# Patient Record
Sex: MALE | Race: BLACK OR AFRICAN AMERICAN | NOT HISPANIC OR LATINO
[De-identification: names, ages, dates, MRNs, and addresses within clinical notes are randomized per-mention and may not be internally consistent; named-entity substitution may affect disease eponyms.]

---

## 2017-04-19 ENCOUNTER — RECORD ABSTRACTING (OUTPATIENT)
Age: 32
End: 2017-04-19

## 2017-04-19 DIAGNOSIS — F25.9 SCHIZOAFFECTIVE DISORDER, UNSPECIFIED: ICD-10-CM

## 2017-04-19 DIAGNOSIS — Z78.9 OTHER SPECIFIED HEALTH STATUS: ICD-10-CM

## 2017-04-19 DIAGNOSIS — Z87.2 PERSONAL HISTORY OF DISEASES OF THE SKIN AND SUBCUTANEOUS TISSUE: ICD-10-CM

## 2017-04-19 DIAGNOSIS — Z86.19 PERSONAL HISTORY OF OTHER INFECTIOUS AND PARASITIC DISEASES: ICD-10-CM

## 2017-04-19 DIAGNOSIS — M25.512 PAIN IN LEFT SHOULDER: ICD-10-CM

## 2017-04-19 PROBLEM — Z00.00 ENCOUNTER FOR PREVENTIVE HEALTH EXAMINATION: Status: ACTIVE | Noted: 2017-04-19

## 2017-04-19 RX ORDER — FENOFIBRATE 145 MG/1
TABLET ORAL
Refills: 0 | Status: ACTIVE | COMMUNITY

## 2017-04-19 RX ORDER — FLUPHENAZINE HCL 5 MG
TABLET ORAL
Refills: 0 | Status: ACTIVE | COMMUNITY

## 2017-04-19 RX ORDER — LORATADINE 10 MG/1
TABLET ORAL
Refills: 0 | Status: ACTIVE | COMMUNITY

## 2017-04-19 RX ORDER — MULTIVITAMIN
TABLET ORAL
Refills: 0 | Status: ACTIVE | COMMUNITY

## 2017-09-26 ENCOUNTER — EMERGENCY (EMERGENCY)
Facility: HOSPITAL | Age: 32
LOS: 0 days | Discharge: HOME | End: 2017-09-26
Admitting: INTERNAL MEDICINE

## 2017-09-26 DIAGNOSIS — F20.0 PARANOID SCHIZOPHRENIA: ICD-10-CM

## 2017-09-26 DIAGNOSIS — M54.5 LOW BACK PAIN: ICD-10-CM

## 2017-09-26 DIAGNOSIS — Z79.899 OTHER LONG TERM (CURRENT) DRUG THERAPY: ICD-10-CM

## 2017-09-26 DIAGNOSIS — F32.9 MAJOR DEPRESSIVE DISORDER, SINGLE EPISODE, UNSPECIFIED: ICD-10-CM

## 2017-10-16 ENCOUNTER — OUTPATIENT (OUTPATIENT)
Dept: OUTPATIENT SERVICES | Facility: HOSPITAL | Age: 32
LOS: 1 days | Discharge: HOME | End: 2017-10-16

## 2017-10-16 ENCOUNTER — APPOINTMENT (OUTPATIENT)
Dept: PODIATRY | Facility: CLINIC | Age: 32
End: 2017-10-16

## 2017-10-16 VITALS — SYSTOLIC BLOOD PRESSURE: 106 MMHG | DIASTOLIC BLOOD PRESSURE: 77 MMHG | HEART RATE: 98 BPM | WEIGHT: 174 LBS

## 2017-10-16 DIAGNOSIS — B35.1 TINEA UNGUIUM: ICD-10-CM

## 2017-10-16 DIAGNOSIS — M79.671 PAIN IN RIGHT FOOT: ICD-10-CM

## 2017-10-16 DIAGNOSIS — L85.3 XEROSIS CUTIS: ICD-10-CM

## 2017-10-16 DIAGNOSIS — M79.672 PAIN IN LEFT FOOT: ICD-10-CM

## 2017-10-18 DIAGNOSIS — M79.671 PAIN IN RIGHT FOOT: ICD-10-CM

## 2017-10-18 DIAGNOSIS — M79.672 PAIN IN LEFT FOOT: ICD-10-CM

## 2017-10-18 DIAGNOSIS — B35.1 TINEA UNGUIUM: ICD-10-CM

## 2017-10-18 DIAGNOSIS — L85.3 XEROSIS CUTIS: ICD-10-CM

## 2018-04-16 ENCOUNTER — APPOINTMENT (OUTPATIENT)
Dept: PODIATRY | Facility: CLINIC | Age: 33
End: 2018-04-16

## 2018-06-21 ENCOUNTER — EMERGENCY (EMERGENCY)
Facility: HOSPITAL | Age: 33
LOS: 0 days | Discharge: HOME | End: 2018-06-21
Admitting: PHYSICIAN ASSISTANT

## 2018-06-21 VITALS
TEMPERATURE: 97 F | SYSTOLIC BLOOD PRESSURE: 112 MMHG | DIASTOLIC BLOOD PRESSURE: 60 MMHG | HEART RATE: 74 BPM | RESPIRATION RATE: 18 BRPM | OXYGEN SATURATION: 99 %

## 2018-06-21 DIAGNOSIS — Z79.899 OTHER LONG TERM (CURRENT) DRUG THERAPY: ICD-10-CM

## 2018-06-21 DIAGNOSIS — B86 SCABIES: ICD-10-CM

## 2018-06-21 DIAGNOSIS — L29.9 PRURITUS, UNSPECIFIED: ICD-10-CM

## 2018-06-21 RX ORDER — PERMETHRIN CREAM 5% W/W 50 MG/G
1 CREAM TOPICAL
Qty: 1 | Refills: 1 | OUTPATIENT
Start: 2018-06-21 | End: 2018-06-22

## 2018-06-21 NOTE — ED PROVIDER NOTE - OBJECTIVE STATEMENT
32 y.o. male with a PMHx of Schizoaffective disorder presented to the ER c/o diffuse pruritis worse at night.  Pt states that there are other patients at [facility] with similar.  Pruritis worse at night.  Pt denies fever, chills, rash.  No other complaints.

## 2018-06-21 NOTE — ED PROVIDER NOTE - MEDICAL DECISION MAKING DETAILS
topical permethrin; counseled; any new or worsening symptoms, pt will return to ER topical permethrin; counseled; any new or worsening symptoms, pt will return to ER  Note complete

## 2019-02-08 PROBLEM — F25.9 SCHIZOAFFECTIVE DISORDER, UNSPECIFIED: Chronic | Status: ACTIVE | Noted: 2018-06-21

## 2019-02-28 ENCOUNTER — APPOINTMENT (OUTPATIENT)
Dept: PODIATRY | Facility: CLINIC | Age: 34
End: 2019-02-28

## 2019-11-02 ENCOUNTER — EMERGENCY (EMERGENCY)
Facility: HOSPITAL | Age: 34
LOS: 0 days | Discharge: HOME | End: 2019-11-02
Admitting: EMERGENCY MEDICINE
Payer: MEDICARE

## 2019-11-02 VITALS
HEART RATE: 90 BPM | OXYGEN SATURATION: 99 % | RESPIRATION RATE: 17 BRPM | SYSTOLIC BLOOD PRESSURE: 100 MMHG | DIASTOLIC BLOOD PRESSURE: 68 MMHG | TEMPERATURE: 99 F

## 2019-11-02 DIAGNOSIS — R68.83 CHILLS (WITHOUT FEVER): ICD-10-CM

## 2019-11-02 DIAGNOSIS — J06.9 ACUTE UPPER RESPIRATORY INFECTION, UNSPECIFIED: ICD-10-CM

## 2019-11-02 PROCEDURE — 99283 EMERGENCY DEPT VISIT LOW MDM: CPT

## 2019-11-02 NOTE — ED PROVIDER NOTE - NS ED ROS FT
Review of Systems    Constitutional: (+) felt warm, (+) chills  Eyes/ENT: (-) blurry vision, (-) epistaxis, (+) sore throat, (+) congestion  Cardiovascular: (-) chest pain, (-) syncope  Respiratory: (+) cough, (-) shortness of breath  Gastrointestinal: (-) pain, (-) nausea, (-) vomiting, (-) diarrhea  Musculoskeletal: (-) neck pain, (-) back pain, (-) joint pain  Integumentary: (-) rash, (-) edema  Neurological: (-) headache, (-) altered mental status

## 2019-11-02 NOTE — ED PROVIDER NOTE - PATIENT PORTAL LINK FT
You can access the FollowMyHealth Patient Portal offered by [facility] by registering at the following website: [email or web address]. By joining [employer]’s FollowMyHealth portal, you will also be able to view your health information using other applications (apps) compatible with our system.

## 2019-11-02 NOTE — ED PROVIDER NOTE - OBJECTIVE STATEMENT
35 yo M c/o flu like symptoms. Patient states since last night he felt warm, had chills, stuffy nose, congestion, sore throat and cough. No CP, SOB, n/v, or abdominal pains.

## 2019-11-02 NOTE — ED ADULT NURSE NOTE - OBJECTIVE STATEMENT
Patient is a 34 year old male presents to ED from [facility] with complaints of flu like symptoms. Patient states since last night he felt warm, had chills, stuffy nose, congestion, sore throat and cough. Denies any chest pain, shortness of breath, nausea, vomiting or abdominal pain

## 2019-11-02 NOTE — ED PROVIDER NOTE - PHYSICAL EXAMINATION
Gen: Alert, NAD, well appearing  Head: NC, AT, PERRL, EOMI, normal lids/conjunctiva  ENT: normal hearing, + nasal congestion. +patent oropharynx with erythema. No exudate. TM normal b/l  Neck: +supple, no tenderness/meningismus,  Pulm: Bilateral BS, normal resp effort, no wheeze/stridor/retractions  CV: RRR, no murmer  Abd: soft, NT/ND  Mskel: no edema/erythema/cyanosis  Skin: no rash, warm/dry  Neuro: AAOx3, no sensory/motor deficits

## 2020-02-14 ENCOUNTER — OUTPATIENT (OUTPATIENT)
Dept: OUTPATIENT SERVICES | Facility: HOSPITAL | Age: 35
LOS: 1 days | Discharge: HOME | End: 2020-02-14
Payer: MEDICARE

## 2020-02-14 PROCEDURE — 92002 INTRM OPH EXAM NEW PATIENT: CPT

## 2020-02-26 ENCOUNTER — OUTPATIENT (OUTPATIENT)
Dept: OUTPATIENT SERVICES | Facility: HOSPITAL | Age: 35
LOS: 1 days | Discharge: HOME | End: 2020-02-26
Payer: MEDICARE

## 2020-02-26 DIAGNOSIS — Z86.69 PERSONAL HISTORY OF OTHER DISEASES OF THE NERVOUS SYSTEM AND SENSE ORGANS: ICD-10-CM

## 2020-02-26 DIAGNOSIS — H02.409 UNSPECIFIED PTOSIS OF UNSPECIFIED EYELID: ICD-10-CM

## 2020-02-26 DIAGNOSIS — H52.7 UNSPECIFIED DISORDER OF REFRACTION: ICD-10-CM

## 2020-02-26 PROCEDURE — 76512 OPH US DX B-SCAN: CPT | Mod: 26

## 2020-02-26 PROCEDURE — 92202 OPSCPY EXTND ON/MAC DRAW: CPT

## 2020-02-26 PROCEDURE — 92012 INTRM OPH EXAM EST PATIENT: CPT

## 2020-06-23 ENCOUNTER — EMERGENCY (EMERGENCY)
Facility: HOSPITAL | Age: 35
LOS: 0 days | Discharge: HOME | End: 2020-06-23
Attending: EMERGENCY MEDICINE | Admitting: EMERGENCY MEDICINE
Payer: MEDICARE

## 2020-06-23 VITALS
RESPIRATION RATE: 18 BRPM | OXYGEN SATURATION: 96 % | SYSTOLIC BLOOD PRESSURE: 119 MMHG | HEART RATE: 85 BPM | DIASTOLIC BLOOD PRESSURE: 79 MMHG | TEMPERATURE: 98 F
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
## 2020-06-23 DIAGNOSIS — S40.811A ABRASION OF RIGHT UPPER ARM, INITIAL ENCOUNTER: ICD-10-CM
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
## 2020-06-23 DIAGNOSIS — S40.812A ABRASION OF LEFT UPPER ARM, INITIAL ENCOUNTER: ICD-10-CM

## 2020-06-23 DIAGNOSIS — M79.644 PAIN IN RIGHT FINGER(S): ICD-10-CM

## 2020-06-23 PROCEDURE — 99283 EMERGENCY DEPT VISIT LOW MDM: CPT | Mod: 25

## 2020-06-23 PROCEDURE — 73130 X-RAY EXAM OF HAND: CPT | Mod: 26,RT

## 2020-06-23 PROCEDURE — 29130 APPL FINGER SPLINT STATIC: CPT

## 2020-06-23 RX ORDER — ACETAMINOPHEN 500 MG
650 TABLET ORAL ONCE
Refills: 0 | Status: COMPLETED | OUTPATIENT
Start: 2020-06-23 | End: 2020-06-23

## 2020-06-23 RX ADMIN — Medication 650 MILLIGRAM(S): at 14:58

## 2020-06-23 NOTE — ED PROVIDER NOTE - CARE PROVIDER_API CALL
Satisfactory
Samm Silva  Orthopaedic Surgery  [address] [place] [address]  Phone: [phone]  Fax: [phone]  Follow Up Time:

## 2020-06-23 NOTE — ED PROVIDER NOTE - PATIENT PORTAL LINK FT
You can access the FollowMyHealth Patient Portal offered by [facility] by registering at the following website: [email or web address]. By joining [employer]’s FollowMyHealth portal, you will also be able to view your health information using other applications (apps) compatible with our system.

## 2020-06-23 NOTE — ED PROVIDER NOTE - PHYSICAL EXAMINATION
CONSTITUTIONAL: Well-appearing; well-nourished; in no apparent distress.   EYES: PERRL; EOM intact.   ENT: normal nose; no rhinorrhea; normal pharynx with no tonsillar hypertrophy.   NECK: Supple; non-tender; no cervical lymphadenopathy. No JVD.   CARDIOVASCULAR: Normal S1, S2; no murmurs, rubs, or gallops.   RESPIRATORY: Normal chest excursion with respiration; breath sounds clear and equal bilaterally; no wheezes, rhonchi, or rales.  GI/: Normal bowel sounds; non-distended; non-tender; no palpable organomegaly.   MS: No evidence of trauma or deformity. + mild ttp to rt 1 MCP. Abduction/adduction thumb intact. No snuffbox ttp. Radial pulses intact. b/l hand  intact. Normal ROM in all four extremities;   SKIN: + mult small abrasions to b/l upper extrem. No lacerations, ecchymosis  NEURO/PSYCH: A & O x 4; grossly unremarkable. mood and manner are appropriate. No focal deficits

## 2020-06-23 NOTE — ED PROVIDER NOTE - NS ED ROS FT
Constitutional: no fever, chills, no recent weight loss, change in appetite or malaise  Eyes: no redness/discharge/pain/vision changes  ENT: no rhinorrhea/ear pain/sore throat  Cardiac: No chest pain, SOB or edema.  Respiratory: No cough or respiratory distress  GI: No nausea, vomiting, diarrhea or abdominal pain.  MS: + rt thumb pain. no loss of ROM, no weakness  Neuro: No headache or weakness. No LOC.  Skin: + mult abrasions to b/l upper extrem  Endocrine: No history of thyroid disease or diabetes.  Except as documented in the HPI, all other systems are negative.

## 2021-05-24 NOTE — ED PROVIDER NOTE - CARE PROVIDER_API CALL
Primary Defect Length In Cm (Final Defect Size - Required For Flaps/Grafts): 1.5 González Gonzalez)  [place] [address]  Phone: [phone]  Fax: [phone]  Follow Up Time: 1-3 Days

## 2021-06-22 ENCOUNTER — APPOINTMENT (OUTPATIENT)
Dept: NUTRITION | Facility: CLINIC | Age: 36
End: 2021-06-22

## 2021-08-04 ENCOUNTER — NON-APPOINTMENT (OUTPATIENT)
Age: 36
End: 2021-08-04

## 2021-08-05 ENCOUNTER — OUTPATIENT (OUTPATIENT)
Dept: OUTPATIENT SERVICES | Facility: HOSPITAL | Age: 36
LOS: 1 days | Discharge: HOME | End: 2021-08-05

## 2021-08-05 ENCOUNTER — APPOINTMENT (OUTPATIENT)
Dept: NUTRITION | Facility: CLINIC | Age: 36
End: 2021-08-05

## 2021-08-24 NOTE — ED PROVIDER NOTE - GASTROINTESTINAL, MLM
08/23/21 1432   Right Leg Edema Point of Measurement   Leg circumference 31 cm   Ankle circumference 24 cm   Left Leg Edema Point of Measurement   Leg circumference 30 cm   Ankle circumference 24 cm   LLE Peripheral Vascular    Capillary Refill Less than/equal to 3 seconds   Color Red   Temperature Warm   Pedal Pulse Palpable   RLE Peripheral Vascular    Capillary Refill Less than/equal to 3 seconds   Color Red   Temperature Warm   Pedal Pulse Palpable   Ankle-Brachial Index   Right Arm Systolic Pressure 156 mmHg   Left Arm Systolic Pressure 0   Left Ankle Systolic Pressure: Posterior Tibial (PT) 0 mmHg   Left Ankle Systolic Pressure: Dorsalis Pedis (DP) 172 mmHg   Left JOSHUA 1.38 mmHg   Wound Heel Lateral;Right #1 08/23/21   Date First Assessed/Time First Assessed: 08/23/21 1431   Present on Hospital Admission: Yes  Wound Approximate Age at First Assessment (Weeks): (c)   Primary Wound Type: Pressure Injury  Location: Heel  Wound Location Orientation: Lateral;Right  Wound. ..    Wound Image    Wound Etiology Pressure Unstageable   Cleansed Cleansed with saline   Wound Length (cm) 2.5 cm   Wound Width (cm) 3 cm   Wound Depth (cm) 0.4 cm   Wound Surface Area (cm^2) 7.5 cm^2   Wound Volume (cm^3) 3 cm^3   Wound Assessment Slough;Eschar moist   Drainage Amount Moderate   Drainage Description Yellow   Wound Odor Mild   Shivani-Wound/Incision Assessment Blanchable erythema   Edges Flat/open edges   Wound Thickness Description Full thickness
[facility]  [place] [address], [address] [address]  Telephone: [phone]    NAME:  José Luis George  YOB: 1934  DATE:  8/23/2021    Case Management Note    Wound Care Management Outside of Clinic:  [] Wound and dressing supply provider:   [] Patient/family performs own wound care  [x] Home Healthcare:  Wife to contact us with name  [] Dressing changes performed once a week at wound care center    Advanced/Additional Wound Treatment (If applicable):   [] Vascular Referral:   [] SNAP Vac:  [] Wound Vac:  [] Skin Substitute:   [] Pressure Reducing Surfaces:  [] Wheelchair Assessment:   [] HBO Therapy:   [] IV Antibiotics:   [] Plastic Surgery Referral:  [x] Other:SANTYL sent to [facility] via fax    Other Notes:  []
Discharge Condition: Stable     Pain: 0    Ambulatory Status: Walker     Discharge Destination: Home     Transportation: Car    Accompanied by: Self     Discharge instructions reviewed with Patient and copy or written instructions have been provided. All questions/concerns have been addressed at this time.
Abdomen soft, non-tender, no guarding.

## 2021-09-20 ENCOUNTER — APPOINTMENT (OUTPATIENT)
Dept: NUTRITION | Facility: CLINIC | Age: 36
End: 2021-09-20

## 2021-10-27 ENCOUNTER — EMERGENCY (EMERGENCY)
Facility: HOSPITAL | Age: 36
LOS: 0 days | Discharge: HOME | End: 2021-10-27
Attending: EMERGENCY MEDICINE | Admitting: EMERGENCY MEDICINE
Payer: MEDICARE

## 2021-10-27 VITALS
TEMPERATURE: 97 F | OXYGEN SATURATION: 100 % | SYSTOLIC BLOOD PRESSURE: 149 MMHG | RESPIRATION RATE: 18 BRPM | HEART RATE: 68 BPM | DIASTOLIC BLOOD PRESSURE: 88 MMHG

## 2021-10-27 VITALS — WEIGHT: 179.9 LBS

## 2021-10-27 DIAGNOSIS — L29.9 PRURITUS, UNSPECIFIED: ICD-10-CM

## 2021-10-27 DIAGNOSIS — F25.9 SCHIZOAFFECTIVE DISORDER, UNSPECIFIED: ICD-10-CM

## 2021-10-27 PROCEDURE — 99283 EMERGENCY DEPT VISIT LOW MDM: CPT

## 2021-10-27 RX ORDER — FAMOTIDINE 10 MG/ML
20 INJECTION INTRAVENOUS ONCE
Refills: 0 | Status: COMPLETED | OUTPATIENT
Start: 2021-10-27 | End: 2021-10-27

## 2021-10-27 RX ORDER — DIPHENHYDRAMINE HCL 50 MG
50 CAPSULE ORAL ONCE
Refills: 0 | Status: COMPLETED | OUTPATIENT
Start: 2021-10-27 | End: 2021-10-27

## 2021-10-27 RX ORDER — DEXAMETHASONE 0.5 MG/5ML
10 ELIXIR ORAL ONCE
Refills: 0 | Status: COMPLETED | OUTPATIENT
Start: 2021-10-27 | End: 2021-10-27

## 2021-10-27 RX ADMIN — Medication 50 MILLIGRAM(S): at 03:57

## 2021-10-27 RX ADMIN — Medication 10 MILLIGRAM(S): at 03:57

## 2021-10-27 RX ADMIN — FAMOTIDINE 20 MILLIGRAM(S): 10 INJECTION INTRAVENOUS at 03:57

## 2021-10-27 NOTE — ED PROVIDER NOTE - PHYSICAL EXAMINATION
PHYSICAL EXAM:    GENERAL: Alert, appears stated age, well appearing, non-toxic  SKIN: Warm, pink and dry. MMM. +diffuse excoriations-- no sign of superinfection   HEAD: NC, AT  EYE: Normal lids/conjunctiva  ENT: Normal hearing, patent oropharynx without erythema or exudate  NECK: +supple. No meningismus  Pulm: Bilateral BS, normal resp effort, no wheezes, stridor, or retractions  CV: RRR, no M/R/G, 2+and = radial pulses  Abd: soft, non-tender, non-distended  Mskel: no erythema, cyanosis, edema. no calf tenderness  Neuro: AAOx3. normal gait

## 2021-10-27 NOTE — ED PROVIDER NOTE - ATTENDING CONTRIBUTION TO CARE
I personally evaluated the patient. I reviewed the Resident’s or Physician Assistant’s note (as assigned above), and agree with the findings and plan except as documented in my note.    37 y/o M with PMH schizoaffective disorder presents with diffuse constant mild itching x days.  No rash;. No lesions on the skin. No CP, SOB. No fevers.     CONSTITUTIONAL: Well-developed; well-nourished; in no acute distress. Sitting up and providing appropriate history and physical examination  SKIN: skin exam is warm and dry, no acute rash.  HEAD: Normocephalic; atraumatic.  EYES: PERRL, 3 mm bilateral, no nystagmus, EOM intact; conjunctiva and sclera clear.  ENT: No nasal discharge; airway clear.  NECK: Supple; non tender.+ full passive ROM in all directions. No JVD  CARD: S1, S2 normal; no murmurs, gallops, or rubs. Regular rate and rhythm. + Symmetric Strong Pulses  RESP: No wheezes, rales or rhonchi. Good air movement bilaterally  ABD: soft; non-distended; non-tender. No Rebound, No Gaurding, No signs of peritnitis, No CVA tenderness  EXT: Normal ROM. No clubbing, cyanosis or edema. Dp and Pt Pulses intact. Cap refill less than 3 seconds  NEURO: CN 2-12 intact, normal finger to nose, normal romberg, stable gait, no sensory or motor deficits, Alert, oriented, grossly unremarkable. No Focal deficits. GCS 15. NIH 0  PSYCH: Cooperative, appropriate.

## 2021-10-27 NOTE — ED PROVIDER NOTE - PROGRESS NOTE DETAILS
MARIELY COBB: Reviewed necessity for follow up. Counseled on red flags and to return for them.  Patient appears well on discharge.

## 2021-10-27 NOTE — ED PROVIDER NOTE - OBJECTIVE STATEMENT
35 y/o M with PMH schizoaffective disorder presents with diffuse constant mild itching x days.  no palliaitng/provoking factors.  no cp/sob/back pain/n/v/d/other sxs.

## 2021-10-27 NOTE — ED PROVIDER NOTE - CARE PROVIDER_API CALL
Ruby Bolton)  Allergy and Immunology; Internal Medicine  [address] [place] [address]  Phone: [phone]  Fax: [phone]  Follow Up Time: 1-3 Days

## 2021-10-27 NOTE — ED ADULT NURSE NOTE - OBJECTIVE STATEMENT
pt is 35 y/o male. pt presents to the ER and states "I have some itching, swelling on my arm". pt denies injury, SOB, CP and fevers.

## 2021-10-27 NOTE — ED PROVIDER NOTE - NS ED ROS FT
Review of Systems    Constitutional: (-) fever   Eyes/ENT: (-) vision changes   Cardiovascular: (-) chest pain, (-) syncope (-) palpitations  Respiratory: (-) cough, (-) shortness of breath  Gastrointestinal: (-) vomiting, (-) diarrhea (-)black/bloody stools (-) abdominal pain  Genitourinary:  (-) dysuria   Musculoskeletal: (-) neck pain, (-) back pain, (-) leg pain/swelling  Integumentary: (-) rash, (-) edema  Neurological: (-) headache, (-) confusion  Hematologic: (-) easy bruising

## 2021-10-27 NOTE — ED PROVIDER NOTE - PATIENT PORTAL LINK FT
You can access the FollowMyHealth Patient Portal offered by [facility] by registering at the following website: [email or web address]. By joining [employer]’s FollowMyHealth portal, you will also be able to view your health information using other applications (apps) compatible with our system.

## 2021-11-11 ENCOUNTER — OUTPATIENT (OUTPATIENT)
Dept: OUTPATIENT SERVICES | Facility: HOSPITAL | Age: 36
LOS: 1 days | Discharge: HOME | End: 2021-11-11

## 2021-11-11 ENCOUNTER — APPOINTMENT (OUTPATIENT)
Dept: NUTRITION | Facility: CLINIC | Age: 36
End: 2021-11-11

## 2022-01-27 ENCOUNTER — APPOINTMENT (OUTPATIENT)
Dept: NUTRITION | Facility: CLINIC | Age: 37
End: 2022-01-27

## 2022-02-25 ENCOUNTER — APPOINTMENT (OUTPATIENT)
Dept: NUTRITION | Facility: CLINIC | Age: 37
End: 2022-02-25

## 2022-06-21 ENCOUNTER — APPOINTMENT (OUTPATIENT)
Dept: NUTRITION | Facility: CLINIC | Age: 37
End: 2022-06-21

## 2022-10-17 NOTE — ED ADULT TRIAGE NOTE - MEANS OF ARRIVAL
ambulatory
Detail Level: Detailed
General Sunscreen Counseling: I recommended a broad spectrum sunscreen with a SPF of 30 or higher.  I explained that SPF 30 sunscreens block approximately 97 percent of the sun's harmful rays.  Sunscreens should be applied at least 15 minutes prior to expected sun exposure and then every 2 hours after that as long as sun exposure continues. If swimming or exercising sunscreen should be reapplied every 45 minutes to an hour after getting wet or sweating.  One ounce, or the equivalent of a shot glass full of sunscreen, is adequate to protect the skin not covered by a bathing suit. I also recommended a lip balm with a sunscreen as well. Sun protective clothing can be used in lieu of sunscreen but must be worn the entire time you are exposed to the sun's rays.

## 2022-11-18 ENCOUNTER — APPOINTMENT (OUTPATIENT)
Dept: OPHTHALMOLOGY | Facility: CLINIC | Age: 37
End: 2022-11-18

## 2023-09-18 NOTE — ED ADULT NURSE NOTE - HIV OFFER
Opt out Consent (Marginal Mandibular)/Introductory Paragraph: The rationale for Mohs was explained to the patient and consent was obtained. The risks, benefits and alternatives to therapy were discussed in detail. Specifically, the risks of damage to the marginal mandibular branch of the facial nerve, infection, scarring, bleeding, prolonged wound healing, incomplete removal, allergy to anesthesia, and recurrence were addressed. Prior to the procedure, the treatment site was clearly identified and confirmed by the patient. All components of Universal Protocol/PAUSE Rule completed.

## 2024-03-26 ENCOUNTER — APPOINTMENT (OUTPATIENT)
Dept: INTERNAL MEDICINE | Facility: CLINIC | Age: 39
End: 2024-03-26

## 2024-03-30 NOTE — ED PROCEDURE NOTE - CPROC ED PHYSICIAN PRESENCE1
Problem: Discharge Planning  Goal: Discharge to home or other facility with appropriate resources  Outcome: Progressing     Problem: Pain  Goal: Verbalizes/displays adequate comfort level or baseline comfort level  Outcome: Progressing     Problem: ABCDS Injury Assessment  Goal: Absence of physical injury  Outcome: Progressing     Problem: Chronic Conditions and Co-morbidities  Goal: Patient's chronic conditions and co-morbidity symptoms are monitored and maintained or improved  Outcome: Progressing     Problem: Safety - Adult  Goal: Free from fall injury  Outcome: Progressing      I was present during the key portion of the procedure.

## 2024-07-30 ENCOUNTER — INPATIENT (INPATIENT)
Facility: HOSPITAL | Age: 39
LOS: 8 days | Discharge: HOME | DRG: 951 | End: 2024-08-08
Attending: PSYCHIATRY & NEUROLOGY | Admitting: PSYCHIATRY & NEUROLOGY
Payer: MEDICARE

## 2024-07-30 VITALS
HEART RATE: 120 BPM | WEIGHT: 130.07 LBS | DIASTOLIC BLOOD PRESSURE: 66 MMHG | SYSTOLIC BLOOD PRESSURE: 120 MMHG | OXYGEN SATURATION: 97 % | RESPIRATION RATE: 19 BRPM | HEIGHT: 71 IN | TEMPERATURE: 98 F

## 2024-07-30 DIAGNOSIS — R46.89 OTHER SYMPTOMS AND SIGNS INVOLVING APPEARANCE AND BEHAVIOR: ICD-10-CM

## 2024-07-30 DIAGNOSIS — F25.9 SCHIZOAFFECTIVE DISORDER, UNSPECIFIED: ICD-10-CM

## 2024-07-30 LAB
ALBUMIN SERPL ELPH-MCNC: 4.5 G/DL — SIGNIFICANT CHANGE UP (ref 3.5–5.2)
ALP SERPL-CCNC: 87 U/L — SIGNIFICANT CHANGE UP (ref 30–115)
ALT FLD-CCNC: 11 U/L — SIGNIFICANT CHANGE UP (ref 0–41)
ANION GAP SERPL CALC-SCNC: 18 MMOL/L — HIGH (ref 7–14)
APAP SERPL-MCNC: <5 UG/ML — LOW (ref 10–30)
AST SERPL-CCNC: 20 U/L — SIGNIFICANT CHANGE UP (ref 0–41)
BASOPHILS # BLD AUTO: 0.02 K/UL — SIGNIFICANT CHANGE UP (ref 0–0.2)
BASOPHILS NFR BLD AUTO: 0.4 % — SIGNIFICANT CHANGE UP (ref 0–1)
BILIRUB SERPL-MCNC: 1.7 MG/DL — HIGH (ref 0.2–1.2)
BUN SERPL-MCNC: 18 MG/DL — SIGNIFICANT CHANGE UP (ref 10–20)
CALCIUM SERPL-MCNC: 9.5 MG/DL — SIGNIFICANT CHANGE UP (ref 8.4–10.5)
CHLORIDE SERPL-SCNC: 104 MMOL/L — SIGNIFICANT CHANGE UP (ref 98–110)
CO2 SERPL-SCNC: 20 MMOL/L — SIGNIFICANT CHANGE UP (ref 17–32)
CREAT SERPL-MCNC: 0.9 MG/DL — SIGNIFICANT CHANGE UP (ref 0.7–1.5)
EGFR: 112 ML/MIN/1.73M2 — SIGNIFICANT CHANGE UP
EOSINOPHIL # BLD AUTO: 0.05 K/UL — SIGNIFICANT CHANGE UP (ref 0–0.7)
EOSINOPHIL NFR BLD AUTO: 1 % — SIGNIFICANT CHANGE UP (ref 0–8)
ETHANOL SERPL-MCNC: <10 MG/DL — SIGNIFICANT CHANGE UP
GLUCOSE SERPL-MCNC: 110 MG/DL — HIGH (ref 70–99)
HCT VFR BLD CALC: 37.4 % — LOW (ref 42–52)
HGB BLD-MCNC: 12.7 G/DL — LOW (ref 14–18)
IMM GRANULOCYTES NFR BLD AUTO: 0.2 % — SIGNIFICANT CHANGE UP (ref 0.1–0.3)
LYMPHOCYTES # BLD AUTO: 2.09 K/UL — SIGNIFICANT CHANGE UP (ref 1.2–3.4)
LYMPHOCYTES # BLD AUTO: 39.8 % — SIGNIFICANT CHANGE UP (ref 20.5–51.1)
MCHC RBC-ENTMCNC: 29.6 PG — SIGNIFICANT CHANGE UP (ref 27–31)
MCHC RBC-ENTMCNC: 34 G/DL — SIGNIFICANT CHANGE UP (ref 32–37)
MCV RBC AUTO: 87.2 FL — SIGNIFICANT CHANGE UP (ref 80–94)
MONOCYTES # BLD AUTO: 0.41 K/UL — SIGNIFICANT CHANGE UP (ref 0.1–0.6)
MONOCYTES NFR BLD AUTO: 7.8 % — SIGNIFICANT CHANGE UP (ref 1.7–9.3)
NEUTROPHILS # BLD AUTO: 2.67 K/UL — SIGNIFICANT CHANGE UP (ref 1.4–6.5)
NEUTROPHILS NFR BLD AUTO: 50.8 % — SIGNIFICANT CHANGE UP (ref 42.2–75.2)
NRBC # BLD: 0 /100 WBCS — SIGNIFICANT CHANGE UP (ref 0–0)
PLATELET # BLD AUTO: 155 K/UL — SIGNIFICANT CHANGE UP (ref 130–400)
PMV BLD: 10 FL — SIGNIFICANT CHANGE UP (ref 7.4–10.4)
POTASSIUM SERPL-MCNC: 3.7 MMOL/L — SIGNIFICANT CHANGE UP (ref 3.5–5)
POTASSIUM SERPL-SCNC: 3.7 MMOL/L — SIGNIFICANT CHANGE UP (ref 3.5–5)
PROT SERPL-MCNC: 7.3 G/DL — SIGNIFICANT CHANGE UP (ref 6–8)
RBC # BLD: 4.29 M/UL — LOW (ref 4.7–6.1)
RBC # FLD: 13.2 % — SIGNIFICANT CHANGE UP (ref 11.5–14.5)
SALICYLATES SERPL-MCNC: <0.3 MG/DL — LOW (ref 4–30)
SARS-COV-2 RNA SPEC QL NAA+PROBE: SIGNIFICANT CHANGE UP
SODIUM SERPL-SCNC: 142 MMOL/L — SIGNIFICANT CHANGE UP (ref 135–146)
WBC # BLD: 5.25 K/UL — SIGNIFICANT CHANGE UP (ref 4.8–10.8)
WBC # FLD AUTO: 5.25 K/UL — SIGNIFICANT CHANGE UP (ref 4.8–10.8)

## 2024-07-30 PROCEDURE — 90792 PSYCH DIAG EVAL W/MED SRVCS: CPT | Mod: GC,2W

## 2024-07-30 PROCEDURE — 36415 COLL VENOUS BLD VENIPUNCTURE: CPT

## 2024-07-30 PROCEDURE — 83036 HEMOGLOBIN GLYCOSYLATED A1C: CPT

## 2024-07-30 PROCEDURE — 84443 ASSAY THYROID STIM HORMONE: CPT

## 2024-07-30 PROCEDURE — 80061 LIPID PANEL: CPT

## 2024-07-30 PROCEDURE — 99223 1ST HOSP IP/OBS HIGH 75: CPT | Mod: FS

## 2024-07-30 PROCEDURE — 80307 DRUG TEST PRSMV CHEM ANLYZR: CPT

## 2024-07-30 RX ORDER — FLUPHENAZINE HCL 1 MG
5 TABLET ORAL
Refills: 0 | Status: DISCONTINUED | OUTPATIENT
Start: 2024-07-30 | End: 2024-08-08

## 2024-07-30 RX ORDER — PRAMIPEXOLE DIHYDROCHLORIDE 0.5 MG/1
5 TABLET ORAL EVERY 6 HOURS
Refills: 0 | Status: DISCONTINUED | OUTPATIENT
Start: 2024-07-30 | End: 2024-08-08

## 2024-07-30 RX ORDER — HYDROXYZINE HCL 50 MG/ML
50 VIAL (ML) INTRAMUSCULAR EVERY 6 HOURS
Refills: 0 | Status: DISCONTINUED | OUTPATIENT
Start: 2024-07-30 | End: 2024-08-08

## 2024-07-30 RX ORDER — ACETAMINOPHEN 500 MG
650 TABLET ORAL EVERY 6 HOURS
Refills: 0 | Status: DISCONTINUED | OUTPATIENT
Start: 2024-07-30 | End: 2024-08-08

## 2024-07-30 RX ORDER — LORAZEPAM 1 MG/1
2 TABLET ORAL EVERY 6 HOURS
Refills: 0 | Status: DISCONTINUED | OUTPATIENT
Start: 2024-07-30 | End: 2024-07-30

## 2024-07-30 RX ORDER — MAGNESIUM, ALUMINUM HYDROXIDE 200-225/5
30 SUSPENSION, ORAL (FINAL DOSE FORM) ORAL EVERY 6 HOURS
Refills: 0 | Status: DISCONTINUED | OUTPATIENT
Start: 2024-07-30 | End: 2024-08-08

## 2024-07-30 RX ADMIN — Medication 5 MILLIGRAM(S): at 20:48

## 2024-07-30 NOTE — ED PROVIDER NOTE - PHYSICAL EXAMINATION
CONST:  Unkempt appearing in NAD  EYES: PERRL, EOMI, Sclera and conjunctiva clear.   ENT: Oropharynx normal appearing, no erythema or exudates. Uvula midline.  CARD: Normal S1 S2; Normal rate and rhythm  RESP: Equal BS B/L, No wheezes, rhonchi or rales. No distress  SKIN: Warm, dry, no acute rashes. Good turgor  PSYCH: Poor eye contact,  redirecting the same question asked back to me, evasive, and seemingly preoccupied with other thoughts when asked a question  NEURO: A&Ox3, No focal deficits. Strength 5/5 with no sensory deficits. Steady gait
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
## 2024-07-30 NOTE — ED BEHAVIORAL HEALTH ASSESSMENT NOTE - VIOLENCE RISK FACTORS:
Lack of insight into violence risk/need for treatment Violent ideation/threat/speech/Lack of insight into violence risk/need for treatment
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
## 2024-07-30 NOTE — ED BEHAVIORAL HEALTH ASSESSMENT NOTE - NSBHMSEPERCEPT_PSY_A_CORE
Auditory hallucinations Patient does not appear to be forthcoming about perceptions as he is clearly responding to internal stimuli but denies hallucinations/Unable to assess

## 2024-07-30 NOTE — ED BEHAVIORAL HEALTH ASSESSMENT NOTE - NSBHATTESTCOMMENTATTENDFT_PSY_A_CORE
This is a 37 yo male with longstanding schizophrenia, referred from his residence [facility] due to worsening psychotic symptoms over weeks to months, leading to more aggressive behavior this morning. Per collateral, patient has been non-adherent to treatment for months (previously on decanoate antipsychotic) and has been increasingly disorganized, talking to himself, impulsive/aggressive. On exam in the ED, patient exhibits very bizarre behavior and thought process, consistent with a psychotic exacerbation. He requires hospitalization to be placed back on antipsychotic medication, for safety and stability.
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
## 2024-07-30 NOTE — ED PROVIDER NOTE - ATTENDING APP SHARED VISIT CONTRIBUTION OF CARE
abdominal pain Patient is a 38 y.o. M, unknown PMH, PPH of schizoaffective disorder, unknown substance use, unknown if multiple or recent psych admissions, unknown mental health services, BIB EMS due to reported aggression. Pt is not able to provide any history and has no complains. On exam, pt in NAD, AAOx3, head NC/AT, CN II-XII intact, PEERL, EOMi, neck (-) midline tenderness, lungs CTA B/L, CV S1S2 regular, abdomen soft/NT/ND/(+)BS, ext (-) edema, motor 5/5x4, sensation intact, ambulating with steady gait, noted to have conversation with himself. Will do labs, psyc consult and reevaluate.

## 2024-07-30 NOTE — ED CDU PROVIDER DISPOSITION NOTE - ATTENDING APP SHARED VISIT CONTRIBUTION OF CARE
I have personally seen and examined this patient. I have fully participated in the care of this patient. I have made amendments to the documentation where appropriate and otherwise agree with the history, physical exam, and plan as documented by the BAILEY.     Attending Contribution to care: This is my contribution to care  39yo male with PMHx schizoaffective disorder presents from [address] [place] [address] for aggressive, combative behavior. As per EMS transfer note, pt was asked by staff at [facility] if he wanted anything further for breakfast and he became aggressive yelling at staff, seemingly unprovoked. [employer] needed to be called to have pt restrained and was thus transferred to the ED. Pt evasive when trying to obtain this history and does not give any details, only reporting he is here for a physical exam. He does note he is prescribed medications but only takes them when he feels like he needs them. Pt was evaluated by telepsych and admission was recommended. Will admit.

## 2024-07-30 NOTE — ED BEHAVIORAL HEALTH ASSESSMENT NOTE - DETAILS
Unable to assess Only hospital chart reviewed Hold for collateral Attempt made. Awaiting call back See collateral info in HPI Patient acutely decompensating. Requires admission due to danger to others and inability to care for self.

## 2024-07-30 NOTE — ED ADULT NURSE NOTE - OBJECTIVE STATEMENT
Pt. BIBA from [address] [place] [address] with [facility] c/o aggressive behavior towards staff. Pt. denies HI/SI.  1:1 initiated in triage.

## 2024-07-30 NOTE — ED CDU PROVIDER INITIAL DAY NOTE - OBJECTIVE STATEMENT
37yo male with PMHx schizoaffective disorder presents from [address] [place] [address] for aggressive, combative behavior. As per EMS transfer note, pt was asked by staff at [facility] if he wanted anything further for breakfast and he became aggressive yelling at staff, seemingly unprovoked. [employer] needed to be called to have pt restrained and was thus transferred to the ED. Pt evasive when trying to obtain this history and does not give any details, only reporting he is here for a physical exam. He does note he is prescribed medications but only takes them when he feels like he needs them. Pt was evaluated by telepsych and they are trying to obtain collateral info.

## 2024-07-30 NOTE — ED PROVIDER NOTE - PROGRESS NOTE DETAILS
Consult placed to Telepsych Telepsych is awaiting collateral info. Will place in Obs while telepsych dispo is on hold.

## 2024-07-30 NOTE — ED CDU PROVIDER INITIAL DAY NOTE - NS_EDPROVIDERDISPOUSERTYPE_ED_A_ED
[id number]    BP (P) 136/86 (BP Location: Right arm)   Pulse (P) 99   Temp (P) 98.9  F (37.2  C) (Oral)   Resp (P) 18   Ht 1.524 m (5')   Wt 58.2 kg (128 lb 4.8 oz)   SpO2 (P) 99%   BMI 25.06 kg/m       VS: AVSS and afebrile on RA.   BG: none.   LABS: 6.7, received 1 unit of PRBC, no transfusion reactions.   Pain: tylenol x1, dil 1mg x1 with some relief.   PRN: atarax x1, simethicone x1, TUMS x1, senna x1, and colace x1.  Nausea: denies.   Diet: regular with good PO intake.   LDA: CVC and PIV.   GI/: voiding adequate amounts, urine sample sent to lab, BM x1 8/11/19.   Skin: abd incision EMILY and liquid banadge.   Mobility: IND.   Education: Med card, lab book updated and gone over, pain management at home, adequate fluid intake at home and not taking medications prior to outpatient AM labs, patient verbalized understanding.   Tests/Procedures: none.   Plan: Discharge today.     All care explained and questions answered. Will continue to monitor and notify team of changes.     DISCHARGE:  Patient with orders to discharge to home.     Education Provided:   Med Card updated (Ganesh will come and do the med to bed for this patient around 1515)  Lab Book updated  Specialty Pharmacy completed and aware  LDAs CVC and PIV are removed, no signs of bleeding or issues with removal. Patient tolerated.  [facility] notified, report given completed.    Patient in stable condition. AVSS. Plan for follow up tomorrow at the [facility] for lab draws and clinic appointment patient aware.         Attending Attestation (For Attendings USE Only)...

## 2024-07-30 NOTE — ED BEHAVIORAL HEALTH ASSESSMENT NOTE - RISK ASSESSMENT
Known modifiable risk factors include loss of rational thinking/psychosis.  Static risk factors include male gender.  Protective factors unknown.

## 2024-07-30 NOTE — ED PROVIDER NOTE - CLINICAL SUMMARY MEDICAL DECISION MAKING FREE TEXT BOX
Patient is a 38 y.o. M, unknown PMH, PPH of schizoaffective disorder, unknown substance use, unknown if multiple or recent psych admissions, unknown mental health services, BIB EMS due to reported aggression. Pt is not able to provide any history and has no complains. On exam, pt in NAD, AAOx3, head NC/AT, CN II-XII intact, PEERL, EOMi, neck (-) midline tenderness, lungs CTA B/L, CV S1S2 regular, abdomen soft/NT/ND/(+)BS, ext (-) edema, motor 5/5x4, sensation intact, ambulating with steady gait, noted to have conversation with himself. Psyc evaluated pt. Will place in obs for reevaluation and collateral.

## 2024-07-31 DIAGNOSIS — F19.90 OTHER PSYCHOACTIVE SUBSTANCE USE, UNSPECIFIED, UNCOMPLICATED: ICD-10-CM

## 2024-07-31 PROCEDURE — 99233 SBSQ HOSP IP/OBS HIGH 50: CPT

## 2024-07-31 RX ORDER — DIPHENHYDRAMINE HCL 25 MG
50 CAPSULE ORAL EVERY 6 HOURS
Refills: 0 | Status: DISCONTINUED | OUTPATIENT
Start: 2024-07-31 | End: 2024-08-08

## 2024-07-31 RX ADMIN — Medication 5 MILLIGRAM(S): at 20:38

## 2024-07-31 RX ADMIN — Medication 5 MILLIGRAM(S): at 08:13
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
## 2024-07-31 NOTE — PSYCHIATRIC REHAB INITIAL EVALUATION - NSBHPRTOOLBOX_PSY_ALL_CORE
Pt uncooperative, [facility] staff states that pt has been non-compliant with OPP services with Dr. Tony and therapist Wendy/Treatment team provider support

## 2024-07-31 NOTE — BH INPATIENT PSYCHIATRY ASSESSMENT NOTE - NSBHCONSBHPROVCNTCTNOFT_PSY_A_CORE
psychiatrist Dr. Tony ([phone]) and previous therapist Wendy Aguirre ([phone]) - patient no longer in treatment and case was closed 3 weeks ago due to non-adherence

## 2024-07-31 NOTE — BH INPATIENT PSYCHIATRY ASSESSMENT NOTE - NSBHMETABOLIC_PSY_ALL_CORE_FT
BMI: BMI (kg/m2): 16.2 (07-30-24 @ 19:00)  HbA1c:   Glucose:   BP: 134/74 (07-31-24 @ 08:00) (120/66 - 134/74)Vital Signs Last 24 Hrs  T(C): 36.8 (07-31-24 @ 08:00), Max: 36.8 (07-31-24 @ 08:00)  T(F): 98.2 (07-31-24 @ 08:00), Max: 98.2 (07-31-24 @ 08:00)  HR: 98 (07-31-24 @ 08:00) (68 - 98)  BP: 134/74 (07-31-24 @ 08:00) (121/81 - 134/74)  BP(mean): --  RR: 20 (07-31-24 @ 08:00) (16 - 20)  SpO2: 95% (07-30-24 @ 17:47) (95% - 95%)      Lipid Panel:

## 2024-07-31 NOTE — BH CHART NOTE - NSEVENTNOTEFT_PSY_ALL_CORE
Spoke to [address] [place] [address] at [phone] to get collateral on patient. Patient takes Prolixin 25mg every two weeks. Pt is currently diagnosed with Schizoaffective disorder, can become irritable at times and does not engage with others. Pt is allergic to berries and rag wheat. Pt has a history of cannabis use. Therapist is Zoila Aguirre can be reach at [phone] and Dr. Funk can be reached at [phone].  Spoke to the Director Carmela Llamas at [address] at [phone] to get collateral on patient. Patient takes Prolixin 25mg every two weeks. Pt is currently diagnosed with Schizoaffective disorder, can become irritable at times and does not engage with others. Pt is allergic to berries and rag wheat. Pt has a history of cannabis use. Therapist is Zoila Aguirre can be reach at [phone] and Dr. Funk can be reached at [phone].  Spoke to the Director Carmela Llamas at [address] at [phone] to get collateral on patient. Patient takes Prolixin dec 25mg every two weeks. Pt is currently diagnosed with Schizoaffective disorder, can become irritable at times and does not engage with others. Pt is allergic to berries and rag weed. Pt has a history of cannabis use. Therapist is Zoila Aguirre can be reach at [phone] and Dr. Funk can be reached at [phone].

## 2024-07-31 NOTE — BH INPATIENT PSYCHIATRY ASSESSMENT NOTE - NSBHMSEPERCEPT_PSY_A_CORE
Patient does not appear to be forthcoming about perceptions as he is clearly responding to internal stimuli but denies hallucinations/Other

## 2024-07-31 NOTE — BH INPATIENT PSYCHIATRY ASSESSMENT NOTE - NSBHATTESTAPPBILLTIME_PSY_A_CORE
I attest my time as BAILEY is greater than 50% of the total combined time spent on qualifying patient care activities. I have reviewed and verified the documentation.

## 2024-07-31 NOTE — BH INPATIENT PSYCHIATRY ASSESSMENT NOTE - NSBHCHARTREVIEWVS_PSY_A_CORE FT
Vital Signs Last 24 Hrs  T(C): 36.8 (07-31-24 @ 08:00), Max: 36.8 (07-31-24 @ 08:00)  T(F): 98.2 (07-31-24 @ 08:00), Max: 98.2 (07-31-24 @ 08:00)  HR: 98 (07-31-24 @ 08:00) (68 - 98)  BP: 134/74 (07-31-24 @ 08:00) (121/81 - 134/74)  BP(mean): --  RR: 20 (07-31-24 @ 08:00) (16 - 20)  SpO2: 95% (07-30-24 @ 17:47) (95% - 95%)

## 2024-07-31 NOTE — BH INPATIENT PSYCHIATRY ASSESSMENT NOTE - CURRENT MEDICATION
MEDICATIONS  (STANDING):  fluPHENAZine 5 milliGRAM(s) Oral two times a day    MEDICATIONS  (PRN):  acetaminophen     Tablet .. 650 milliGRAM(s) Oral every 6 hours PRN Temp greater or equal to 38C (100.4F), Moderate Pain (4 - 6), Severe Pain (7 - 10)  aluminum hydroxide/magnesium hydroxide/simethicone Suspension 30 milliLiter(s) Oral every 6 hours PRN Dyspepsia  diphenhydrAMINE 50 milliGRAM(s) Oral every 6 hours PRN EPS  haloperidol     Tablet 5 milliGRAM(s) Oral every 6 hours PRN agitation  hydrOXYzine hydrochloride 50 milliGRAM(s) Oral every 6 hours PRN Anxiety  LORazepam     Tablet 2 milliGRAM(s) Oral every 6 hours PRN severe agitation
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
## 2024-08-01 LAB
A1C WITH ESTIMATED AVERAGE GLUCOSE RESULT: 6 % — HIGH (ref 4–5.6)
CHOLEST SERPL-MCNC: 146 MG/DL — SIGNIFICANT CHANGE UP
ESTIMATED AVERAGE GLUCOSE: 126 MG/DL — HIGH (ref 68–114)
HDLC SERPL-MCNC: 58 MG/DL — SIGNIFICANT CHANGE UP
LIPID PNL WITH DIRECT LDL SERPL: 79 MG/DL — SIGNIFICANT CHANGE UP
NON HDL CHOLESTEROL: 88 MG/DL — SIGNIFICANT CHANGE UP
TRIGL SERPL-MCNC: 45 MG/DL — SIGNIFICANT CHANGE UP
TSH SERPL-MCNC: 0.92 UIU/ML — SIGNIFICANT CHANGE UP (ref 0.27–4.2)

## 2024-08-01 PROCEDURE — 99232 SBSQ HOSP IP/OBS MODERATE 35: CPT

## 2024-08-01 PROCEDURE — 99221 1ST HOSP IP/OBS SF/LOW 40: CPT

## 2024-08-01 RX ADMIN — Medication 5 MILLIGRAM(S): at 08:25

## 2024-08-01 RX ADMIN — Medication 5 MILLIGRAM(S): at 20:41

## 2024-08-01 NOTE — BH INPATIENT PSYCHIATRY PROGRESS NOTE - NSBHFUPINTERVALHXFT_PSY_A_CORE
Patient seen and evaluated. As per nursing report no acute events. Compliant with meds. On approach patient calm and cooperative. No agitation or aggression noted. In good behavioral control. States “I’m peaceful right now” and starts to laugh when asked how he was doing. Laughs inappropriately during conversation.  Appears to be internally preoccupied and responding to internal stimuli. Re-started on Prolixin. Will continue to monitor and titrate accordingly. Patient with poor ADL’s. Writer discussed hygiene with patient and encouraged patient to take a shower. Patient denies any suicidal/homicidal ideations. Patient isolative to the room. Comes out for meals. Patient encouraged to get out of the room, engage with peers and attend groups.

## 2024-08-01 NOTE — H&P ADULT - ASSESSMENT
schizoaffective  - as per psych    pre-dm  - pt says he used to follow with dr glover  - needs outpt a1c followup, within 3 months    recall as needed

## 2024-08-01 NOTE — BH TREATMENT PLAN - NSTXPLANTHERAPYSESSIONSFT_PSY_ALL_CORE
07-31-24  Type of therapy: Anger management, Coping skills, Creative arts therapy, Inspiration and motiviation, Leisure development, Self esteem, Social skills training, Stress management, Symptom management  Type of session: Individual  Level of patient participation: Resistance to participation  Duration of participation: Less than 15 minutes  Therapy conducted by: Psych rehab  Therapy Summary: Pt will need increased encouragement to attend group therapy sessions

## 2024-08-01 NOTE — BH TREATMENT PLAN - NSTXVIOLNTINTERRN_PSY_ALL_CORE
RN to maintain a consistent approach and provide and structured environment.  RN to redirect agitation and potentially violent behaviors and decrease environmental stimuli.  RN to encourage medication compliance and provide support and education as needed on Dx, coping skills, medication, and safety planning.  RN will assess and intervene for any disruptive behaviors  RN to Encourage daily ADL's  RN to Encourage group attendance
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
## 2024-08-02 PROCEDURE — 99232 SBSQ HOSP IP/OBS MODERATE 35: CPT

## 2024-08-02 RX ADMIN — Medication 5 MILLIGRAM(S): at 19:36

## 2024-08-02 RX ADMIN — Medication 5 MILLIGRAM(S): at 08:32

## 2024-08-02 NOTE — BH INPATIENT PSYCHIATRY PROGRESS NOTE - NSBHFUPINTERVALHXFT_PSY_A_CORE
Patient seen and evaluated. As per nursing report no acute events. Compliant with meds. On approach patient calm and cooperative. No agitation or aggression noted. Has been in good behavioral control since admission. Continues to laugh inappropriately at times.  Appears to be internally preoccupied and responding to internal stimuli. Recently re-started on Prolixin. Will continue to monitor and titrate accordingly. Patient with poor ADL’s. Writer again discussed hygiene with patient and encouraged patient to take a shower. Patient denies any suicidal/homicidal ideations. Patient isolative to the room. Comes out for meals. Patient encouraged to get out of the room, engage with peers and attend groups.

## 2024-08-02 NOTE — BH INPATIENT PSYCHIATRY PROGRESS NOTE - NSBHASSESSSUMMFT_PSY_ALL_CORE
Patient is a 38 y.o. M domiciled at [facility], PMH of eczema, PPH of schizoaffective disorder, cannabis use, previous psychiatrist Dr. Tony ([phone]) and previous therapist Wendy Aguirre ([phone]) - patient no longer in treatment and case was closed 3 weeks ago due to non-adherence per [facility] director, no known SA or NSSIB, BIB EMS due to reported aggression.   Patient was interviewed over telepsych monitor. Patient was sitting on bed and appeared to be responding to internal stimuli. Patient was uncooperative with majority of interview. Patient reports that he is unsure why they "nominated" him to come into the ED. He initially reports that he was eating and watching tv in his room when they called EMS. He later discloses that he was being aggressive because he felt disrespected by "Remington" and "confronted him". Patient unwilling to expound. Patient denies auditory or visual hallucinations. Patient unwilling to answer any further questions.    Patient seen and evaluated. As per nursing report no acute events. Compliant with meds. On approach patient calm and cooperative. No agitation or aggression noted. Has been in good behavioral control since admission. Continues to laugh inappropriately at times.  Appears to be internally preoccupied and responding to internal stimuli. Recently re-started on Prolixin. Will continue to monitor and titrate accordingly. Patient with poor ADL’s. Writer again discussed hygiene with patient and encouraged patient to take a shower. Patient denies any suicidal/homicidal ideations. Patient isolative to the room. Comes out for meals. Patient encouraged to get out of the room, engage with peers and attend groups.    #Admit    #Schizoaffective disorder  -Prolixin 5mg BID    -Hydroxyzine 50mg Q6 PRN for anxiety or insomnia  -Haldol 5mg Q6 PRN for agitation or psychosis  -Benadryl 50mg Q6 PRN for EPS  -Lorazepam 2mg Q6 PRN for aggression    #Agitation  -for agitation not amenable to verbal redirection, may give haldol 5 mg q6h prn, ativan 2 mg q6h prn, benadryl 50 mg q6h prn with escalation to IM if pt is a danger to self or/and others with repeat EKG to ensure QTc <500 ms.

## 2024-08-03 RX ADMIN — Medication 5 MILLIGRAM(S): at 20:42

## 2024-08-03 RX ADMIN — Medication 5 MILLIGRAM(S): at 08:01

## 2024-08-03 NOTE — BH INPATIENT PSYCHIATRY PROGRESS NOTE - NSBHASSESSSUMMFT_PSY_ALL_CORE
Patient is a 38 y.o. M domiciled at [facility], PMH of eczema, PPH of schizoaffective disorder, cannabis use, previous psychiatrist Dr. Tony ([phone]) and previous therapist Wendy Aguirre ([phone]) - patient no longer in treatment and case was closed 3 weeks ago due to non-adherence per [facility] director, no known SA or NSSIB, BIB EMS due to reported aggression.   Patient was interviewed over telepsych monitor. Patient was sitting on bed and appeared to be responding to internal stimuli. Patient was uncooperative with majority of interview. Patient reports that he is unsure why they "nominated" him to come into the ED. He initially reports that he was eating and watching tv in his room when they called EMS. He later discloses that he was being aggressive because he felt disrespected by "Remington" and "confronted him". Patient unwilling to expound. Patient denies auditory or visual hallucinations. Patient unwilling to answer any further questions.    Patient seen and evaluated. As per nursing report no acute events. Compliant with meds. On approach patient calm and cooperative. No agitation or aggression noted. Has been in good behavioral control since admission. Continues to laugh inappropriately at times.  Appears to be internally preoccupied and responding to internal stimuli. Recently re-started on Prolixin. Will continue to monitor and titrate accordingly. Patient with poor ADL’s. Writer again discussed hygiene with patient and encouraged patient to take a shower. Patient denies any suicidal/homicidal ideations. Patient isolative to the room. Comes out for meals. Patient encouraged to get out of the room, engage with peers and attend groups.    #Admit    #Schizoaffective disorder  -Prolixin 5mg BID    -Hydroxyzine 50mg Q6 PRN for anxiety or insomnia  -Haldol 5mg Q6 PRN for agitation or psychosis  -Benadryl 50mg Q6 PRN for EPS  -Lorazepam 2mg Q6 PRN for aggression    #Agitation  -for agitation not amenable to verbal redirection, may give haldol 5 mg q6h prn, ativan 2 mg q6h prn, benadryl 50 mg q6h prn with escalation to IM if pt is a danger to self or/and others with repeat EKG to ensure QTc <500 ms.

## 2024-08-03 NOTE — BH INPATIENT PSYCHIATRY PROGRESS NOTE - NSBHFUPINTERVALHXFT_PSY_A_CORE
Patient seen and evaluated. As per nursing report no acute events. Compliant with meds. On approach patient calm and cooperative. No agitation or aggression noted. Has been in good behavioral control since admission. Continues to laugh inappropriately at times.  Appears to be internally preoccupied and responding to internal stimuli. Recently re-started on Prolixin. Will continue to monitor and titrate accordingly. Patient with poor ADL’s. Writer again discussed hygiene with patient and encouraged patient to take a shower. Patient denies any suicidal/homicidal ideations. Patient isolative to the room. Comes out for meals. Patient encouraged to get out of the room, engage with peers and attend groups. Patient seen and evaluated. As per nursing report no acute events. Compliant with meds.     On approach patient calm and cooperative. No agitation or aggression noted. Has been in good behavioral control since admission. Continues to laugh inappropriately at times.  Appears to be internally preoccupied and responding to internal stimuli. Recently re-started on Prolixin. States prolixin helps stabilize his mood and keep his head clear for his diagnosis of bipolar disorder. Patient with poor ADL’s. Patient denies any suicidal/homicidal ideations. Patient isolative to the room. Comes out for meals. Patient encouraged to get out of the room, engage with peers and attend groups.

## 2024-08-04 RX ADMIN — Medication 5 MILLIGRAM(S): at 20:31

## 2024-08-04 RX ADMIN — Medication 5 MILLIGRAM(S): at 08:11

## 2024-08-04 NOTE — BH INPATIENT PSYCHIATRY PROGRESS NOTE - NSBHASSESSSUMMFT_PSY_ALL_CORE
Patient is a 38 y.o. M domiciled at [facility], PMH of eczema, PPH of schizoaffective disorder, cannabis use, previous psychiatrist Dr. Tony ([phone]) and previous therapist Wendy Aguirre ([phone]) - patient no longer in treatment and case was closed 3 weeks ago due to non-adherence per [facility] director, no known SA or NSSIB, BIB EMS due to reported aggression.   Patient was interviewed over telepsych monitor. Patient was sitting on bed and appeared to be responding to internal stimuli. Patient was uncooperative with majority of interview. Patient reports that he is unsure why they "nominated" him to come into the ED. He initially reports that he was eating and watching tv in his room when they called EMS. He later discloses that he was being aggressive because he felt disrespected by "Remington" and "confronted him". Patient unwilling to expound. Patient denies auditory or visual hallucinations. Patient unwilling to answer any further questions.    Patient seen and evaluated. As per nursing report no acute events. Compliant with meds. On approach patient calm and cooperative. No agitation or aggression noted. Has been in good behavioral control since admission. Continues to laugh inappropriately at times.  Appears to be internally preoccupied and responding to internal stimuli. Recently re-started on Prolixin. Will continue to monitor and titrate accordingly. Patient with poor ADL’s. Writer again discussed hygiene with patient and encouraged patient to take a shower. Patient denies any suicidal/homicidal ideations. Patient isolative to the room. Comes out for meals. Patient encouraged to get out of the room, engage with peers and attend groups.    #Admit    #Schizoaffective disorder  -Prolixin 5mg BID    -Hydroxyzine 50mg Q6 PRN for anxiety or insomnia  -Haldol 5mg Q6 PRN for agitation or psychosis  -Benadryl 50mg Q6 PRN for EPS  -Lorazepam 2mg Q6 PRN for aggression    #Agitation  -for agitation not amenable to verbal redirection, may give haldol 5 mg q6h prn, ativan 2 mg q6h prn, benadryl 50 mg q6h prn with escalation to IM if pt is a danger to self or/and others with repeat EKG to ensure QTc <500 ms.

## 2024-08-04 NOTE — BH INPATIENT PSYCHIATRY PROGRESS NOTE - NSBHFUPINTERVALHXFT_PSY_A_CORE
Chart reviewed , discussed with staff and patient evaluated  by his bed side.    As per nursing report no acute events. Compliant with meds.     On approach patient calm and cooperative , sitting  on his bed.   Staff reports that  he  does not take shower , has poor ADLS , socially   withdrawn,   Continues to laugh inappropriately at times.  Appears to be internally preoccupied and responding to internal stimuli. Recently re-started on Prolixin. States prolixin helps stabilize his mood and keep his head clear for his diagnosis of bipolar disorder. Patient denies any suicidal/homicidal ideations. Patient isolative to the room. Comes out for meals. Patient encouraged to get out of the room, engage with peers and attend groups. He is compliant with medications .

## 2024-08-05 LAB — DRUG SCREEN, SERUM: ABNORMAL

## 2024-08-05 PROCEDURE — 99232 SBSQ HOSP IP/OBS MODERATE 35: CPT

## 2024-08-05 RX ORDER — LORAZEPAM 1 MG/1
2 TABLET ORAL EVERY 6 HOURS
Refills: 0 | Status: DISCONTINUED | OUTPATIENT
Start: 2024-08-07 | End: 2024-08-08

## 2024-08-05 RX ADMIN — Medication 5 MILLIGRAM(S): at 08:26

## 2024-08-05 RX ADMIN — Medication 5 MILLIGRAM(S): at 21:02

## 2024-08-05 NOTE — BH INPATIENT PSYCHIATRY PROGRESS NOTE - NSBHFUPINTERVALHXFT_PSY_A_CORE
Patient seen and evaluated. As per nursing report no acute events. Compliant with meds. On approach patient calm and cooperative, pleasant. No agitation or aggression noted. Has been in good behavioral control since admission. Continues to laugh inappropriately at times although less so.  Appears to be internally preoccupied and responding to internal stimuli, although there is some noted improvement. Appears to be responding well to the medication. Patient continues to have poor ADL’s. Writer again discussed hygiene with patient and encouraged patient to take a shower. Patient denies any suicidal/homicidal ideations. Patient isolative to the room. Comes out for meals. Patient encouraged to get out of the room, engage with peers and attend groups

## 2024-08-05 NOTE — BH INPATIENT PSYCHIATRY PROGRESS NOTE - NSBHASSESSSUMMFT_PSY_ALL_CORE
Patient is a 38 y.o. M domiciled at [facility], PMH of eczema, PPH of schizoaffective disorder, cannabis use, previous psychiatrist Dr. Tony ([phone]) and previous therapist Wendy Aguirre ([phone]) - patient no longer in treatment and case was closed 3 weeks ago due to non-adherence per [facility] director, no known SA or NSSIB, BIB EMS due to reported aggression.   Patient was interviewed over telepsych monitor. Patient was sitting on bed and appeared to be responding to internal stimuli. Patient was uncooperative with majority of interview. Patient reports that he is unsure why they "nominated" him to come into the ED. He initially reports that he was eating and watching tv in his room when they called EMS. He later discloses that he was being aggressive because he felt disrespected by "Remington" and "confronted him". Patient unwilling to expound. Patient denies auditory or visual hallucinations. Patient unwilling to answer any further questions.    Patient seen and evaluated. As per nursing report no acute events. Compliant with meds. On approach patient calm and cooperative, pleasant. No agitation or aggression noted. Has been in good behavioral control since admission. Continues to laugh inappropriately at times although less so.  Appears to be internally preoccupied and responding to internal stimuli, although there is some noted improvement. Appears to be responding well to the medication. Patient continues to have poor ADL’s. Writer again discussed hygiene with patient and encouraged patient to take a shower. Patient denies any suicidal/homicidal ideations. Patient isolative to the room. Comes out for meals. Patient encouraged to get out of the room, engage with peers and attend groups    #Admit    #Schizoaffective disorder  -Prolixin 5mg BID    -Hydroxyzine 50mg Q6 PRN for anxiety or insomnia  -Haldol 5mg Q6 PRN for agitation or psychosis  -Benadryl 50mg Q6 PRN for EPS  -Lorazepam 2mg Q6 PRN for aggression    #Agitation  -for agitation not amenable to verbal redirection, may give haldol 5 mg q6h prn, ativan 2 mg q6h prn, benadryl 50 mg q6h prn with escalation to IM if pt is a danger to self or/and others with repeat EKG to ensure QTc <500 ms.

## 2024-08-06 PROCEDURE — 99232 SBSQ HOSP IP/OBS MODERATE 35: CPT

## 2024-08-06 RX ADMIN — Medication 5 MILLIGRAM(S): at 20:03

## 2024-08-06 RX ADMIN — Medication 5 MILLIGRAM(S): at 08:24

## 2024-08-06 NOTE — BH SAFETY PLAN - WARNING SIGN 1
The situation is that I was acting aggressive and manic and people were worried that my emotional status was not well. I didn't get aggressive in a physical way just emotional.

## 2024-08-06 NOTE — BH INPATIENT PSYCHIATRY PROGRESS NOTE - NSBHFUPINTERVALHXFT_PSY_A_CORE
Patient seen and evaluated. As per nursing report no acute events. Compliant with meds. On approach patient calm and cooperative, pleasant. No agitation or aggression noted. Has been in good behavioral control since admission. Appears to be responding well to the medication. Patient denies any suicidal/homicidal ideations. Patient denies any A/V hallucinations. Patient not laughing inappropriately anymore. Patient isolative to the room. Comes out for meals. Patient encouraged to get out of the room, engage with peers and attend groups. Anticipated discharge later in week provided patient continues to improve. Patient seen and evaluated. As per nursing report no acute events. Compliant with meds. On approach patient calm and cooperative, pleasant. No agitation or aggression noted. Has been in good behavioral control since admission. Appears to be responding well to the medication. Discussed LAST. Patient declined. Patient denies any suicidal/homicidal ideations. Patient denies any A/V hallucinations. Patient not laughing inappropriately anymore. Patient isolative to the room. Comes out for meals. Patient encouraged to get out of the room, engage with peers and attend groups. Anticipated discharge later in week provided patient continues to improve.

## 2024-08-06 NOTE — BH INPATIENT PSYCHIATRY DISCHARGE NOTE - NSBHFUPINTERVALHXFT_PSY_A_CORE
Patient seen and evaluated 8/7/24. As per nursing report no acute events. Compliant with meds. On approach patient calm and cooperative, pleasant. No agitation or aggression noted. Has been in good behavioral control since admission. Appears to be responding well to the medication. Patient denies any suicidal/homicidal ideations. Patient denies any A/V hallucinations. Patient less isolative to the room. Comes out for meals, walks around unit. Sporadically. Attends art group. Discussed discharge, coping skills and compliance with medication. Discussed LAST. Patient declined and stated he would rather stay on th pills. Anticipated discharge tomorrow 8/8/24. Compliant with medication. Does not warrant continued Inpatient hospitalization. Patient does not present a risk to self or others at this time.

## 2024-08-06 NOTE — BH INPATIENT PSYCHIATRY PROGRESS NOTE - NSBHASSESSSUMMFT_PSY_ALL_CORE
Patient is a 38 y.o. M domiciled at [facility], PMH of eczema, PPH of schizoaffective disorder, cannabis use, previous psychiatrist Dr. Tony ([phone]) and previous therapist Wenyd Aguirre ([phone]) - patient no longer in treatment and case was closed 3 weeks ago due to non-adherence per [facility] director, no known SA or NSSIB, BIB EMS due to reported aggression.   Patient was interviewed over telepsych monitor. Patient was sitting on bed and appeared to be responding to internal stimuli. Patient was uncooperative with majority of interview. Patient reports that he is unsure why they "nominated" him to come into the ED. He initially reports that he was eating and watching tv in his room when they called EMS. He later discloses that he was being aggressive because he felt disrespected by "Remington" and "confronted him". Patient unwilling to expound. Patient denies auditory or visual hallucinations. Patient unwilling to answer any further questions.    Patient seen and evaluated. As per nursing report no acute events. Compliant with meds. On approach patient calm and cooperative, pleasant. No agitation or aggression noted. Has been in good behavioral control since admission. Appears to be responding well to the medication. Patient denies any suicidal/homicidal ideations. Patient denies any A/V hallucinations. Patient not laughing inappropriately anymore. Patient isolative to the room. Comes out for meals. Patient encouraged to get out of the room, engage with peers and attend groups. Anticipated discharge later in week provided patient continues to improve.    #Admit    #Schizoaffective disorder  -Prolixin 5mg BID    -Hydroxyzine 50mg Q6 PRN for anxiety or insomnia  -Haldol 5mg Q6 PRN for agitation or psychosis  -Benadryl 50mg Q6 PRN for EPS  -Lorazepam 2mg Q6 PRN for aggression    #Agitation  -for agitation not amenable to verbal redirection, may give haldol 5 mg q6h prn, ativan 2 mg q6h prn, benadryl 50 mg q6h prn with escalation to IM if pt is a danger to self or/and others with repeat EKG to ensure QTc <500 ms.   Patient is a 38 y.o. M domiciled at [facility], PMH of eczema, PPH of schizoaffective disorder, cannabis use, previous psychiatrist Dr. Tony ([phone]) and previous therapist Wendy Aguirre ([phone]) - patient no longer in treatment and case was closed 3 weeks ago due to non-adherence per [facility] director, no known SA or NSSIB, BIB EMS due to reported aggression.   Patient was interviewed over telepsych monitor. Patient was sitting on bed and appeared to be responding to internal stimuli. Patient was uncooperative with majority of interview. Patient reports that he is unsure why they "nominated" him to come into the ED. He initially reports that he was eating and watching tv in his room when they called EMS. He later discloses that he was being aggressive because he felt disrespected by "Remington" and "confronted him". Patient unwilling to expound. Patient denies auditory or visual hallucinations. Patient unwilling to answer any further questions.    Patient seen and evaluated. As per nursing report no acute events. Compliant with meds. On approach patient calm and cooperative, pleasant. No agitation or aggression noted. Has been in good behavioral control since admission. Appears to be responding well to the medication. Discussed LAST. Patient declined. Patient denies any suicidal/homicidal ideations. Patient denies any A/V hallucinations. Patient not laughing inappropriately anymore. Patient isolative to the room. Comes out for meals. Patient encouraged to get out of the room, engage with peers and attend groups. Anticipated discharge later in week provided patient continues to improve.    #Admit    #Schizoaffective disorder  -Prolixin 5mg BID    -Hydroxyzine 50mg Q6 PRN for anxiety or insomnia  -Haldol 5mg Q6 PRN for agitation or psychosis  -Benadryl 50mg Q6 PRN for EPS  -Lorazepam 2mg Q6 PRN for aggression    #Agitation  -for agitation not amenable to verbal redirection, may give haldol 5 mg q6h prn, ativan 2 mg q6h prn, benadryl 50 mg q6h prn with escalation to IM if pt is a danger to self or/and others with repeat EKG to ensure QTc <500 ms.

## 2024-08-06 NOTE — BH INPATIENT PSYCHIATRY PROGRESS NOTE - NSBHATTESTTYPEVISIT_PSY_A_CORE
HISTORY OF PRESENT ILLNESS:  Eileen Tavarez is a 56-year-old female who walks in requesting to be seen in and evaluated as she is quite concerned about the rash.   Patient complains of insidious onset of rash of about 2 weeks' duration. Patient was given prescription for cephalexin about a week ago. There is no crusting anymore however patient continues to have some rash under her lower lip that is erythematous, itching and burning. Symptoms seem to be worse after brushing her teeth. She denies other rashes.    PAST MEDICAL HISTORY:  Past Medical History:   Diagnosis Date   • Acute encephalopathy    • Anxiety    • Arthritis    • Vitamin B12 deficiency        MEDICATIONS:   Current Outpatient Medications   Medication Sig Dispense Refill   • Cholecalciferol (VITAMIN D PO)      • triamcinolone (ARISTOCORT) 0.1 % cream Apply topically to affected area twice daily. 15 g 0     No current facility-administered medications for this visit.        ALLERGIES:   ALLERGIES:  No Known Allergies    SOCIAL HISTORY:  Social History     Socioeconomic History   • Marital status: /Civil Union     Spouse name: Not on file   • Number of children: Not on file   • Years of education: Not on file   • Highest education level: Not on file   Occupational History   • Not on file   Social Needs   • Financial resource strain: Not on file   • Food insecurity:     Worry: Not on file     Inability: Not on file   • Transportation needs:     Medical: Not on file     Non-medical: Not on file   Tobacco Use   • Smoking status: Never Smoker   • Smokeless tobacco: Never Used   Substance and Sexual Activity   • Alcohol use: No   • Drug use: No   • Sexual activity: Yes     Partners: Male   Lifestyle   • Physical activity:     Days per week: Not on file     Minutes per session: Not on file   • Stress: Not on file   Relationships   • Social connections:     Talks on phone: Not on file     Gets together: Not on file     Attends [employer] service: Not on  file     Active member of club or organization: Not on file     Attends meetings of clubs or organizations: Not on file     Relationship status: Not on file   • Intimate partner violence:     Fear of current or ex partner: Not on file     Emotionally abused: Not on file     Physically abused: Not on file     Forced sexual activity: Not on file   Other Topics Concern   • Not on file   Social History Narrative   • Not on file       PHYSICAL EXAMINATION:   GENERAL: The patient is alert, awake, oriented ×3, in no acute distress.   VITAL SIGNS:  Visit Vitals  /66   Pulse 60   Temp 97.9 °F (36.6 °C) (Oral)   Resp 14   Ht 5' 6\" (1.676 m)   Wt 71.8 kg   LMP  (LMP Unknown)   SpO2 97%   BMI 25.55 kg/m²       Skin: Slightly scaly Erythematous area about 1.5 x 2 cm seen under the lower lip. No drainage. Not tender. No crusting or honeycombing appearance.  Mucosa inside the mouth within normal.      ASSESSMENT AND PLAN:   (L30.9) Eczema, unspecified type  (primary encounter diagnosis)  Comment: Infection has markedly improved. I feel patient symptoms now are related to eczema.  Plan: Triamcinolone 0.1% cream twice daily to the rash  Avoid contact with  Washing the area with unscented dove soap and water after brushing the teeth      Call the clinic next week with how she is doing.   On-site Attending supervising BAILEY ([id number] codes)
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
## 2024-08-06 NOTE — BH INPATIENT PSYCHIATRY DISCHARGE NOTE - NSBHMETABOLIC_PSY_ALL_CORE_FT
BMI: BMI (kg/m2): 16.2 (07-30-24 @ 19:00)  HbA1c: A1C with Estimated Average Glucose Result: 6.0 % (08-01-24 @ 08:23)    Glucose:   BP: 126/95 (08-06-24 @ 08:14) (118/83 - 135/104)Vital Signs Last 24 Hrs  T(C): 36.5 (08-06-24 @ 08:14), Max: 36.5 (08-06-24 @ 08:14)  T(F): 97.7 (08-06-24 @ 08:14), Max: 97.7 (08-06-24 @ 08:14)  HR: 98 (08-06-24 @ 08:40) (98 - 118)  BP: 126/95 (08-06-24 @ 08:14) (126/95 - 126/95)  BP(mean): --  RR: 18 (08-06-24 @ 08:14) (18 - 18)  SpO2: --      Lipid Panel: Date/Time: 08-01-24 @ 08:23  Cholesterol, Serum: 146  LDL Cholesterol Calculated: 79  HDL Cholesterol, Serum: 58  Total Cholesterol/HDL Ration Measurement: --  Triglycerides, Serum: 45
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
## 2024-08-06 NOTE — BH INPATIENT PSYCHIATRY DISCHARGE NOTE - NSDCMRMEDTOKEN_GEN_ALL_CORE_FT
Elimite 5% topical cream: Apply topically to affected area once a day    fluPHENAZine 5 mg oral tablet: 1 tab(s) orally 2 times a day x 30 days Continue until told otherwise by your provider

## 2024-08-07 PROCEDURE — 99232 SBSQ HOSP IP/OBS MODERATE 35: CPT

## 2024-08-07 RX ORDER — FLUPHENAZINE HCL 1 MG
1 TABLET ORAL
Qty: 60 | Refills: 0
Start: 2024-08-07 | End: 2024-09-05

## 2024-08-07 RX ADMIN — Medication 5 MILLIGRAM(S): at 20:45

## 2024-08-07 RX ADMIN — Medication 5 MILLIGRAM(S): at 08:23

## 2024-08-07 NOTE — BH INPATIENT PSYCHIATRY PROGRESS NOTE - NSBHFUPINTERVALHXFT_PSY_A_CORE
Patient seen and evaluated. As per nursing report no acute events. Compliant with meds. On approach patient calm and cooperative, pleasant. No agitation or aggression noted. Has been in good behavioral control since admission. Appears to be responding well to the medication. Patient denies any suicidal/homicidal ideations. Patient denies any A/V hallucinations. Patient not laughing inappropriately anymore. Patient less isolative to the room. Comes out for meals, walks around unit. Sporadically. Attends art group. Anticipated discharge tomorrow 8/8/24. Compliant with medication. Does not warrant continued Inpatient hospitalization. Patient does not present a risk to self or others at this time. Patient seen and evaluated. As per nursing report no acute events. Compliant with meds. On approach patient calm and cooperative, pleasant. No agitation or aggression noted. Has been in good behavioral control since admission. Appears to be responding well to the medication. Patient denies any suicidal/homicidal ideations. Patient denies any A/V hallucinations. Patient not laughing inappropriately anymore. Patient less isolative to the room. Comes out for meals, walks around unit. Sporadically. Attends art group. Discussed discharge, coping skills and compliance with medication. Discussed LAST. Patient declined and stated he would rather stay on th pills. Anticipated discharge tomorrow 8/8/24. Compliant with medication. Does not warrant continued Inpatient hospitalization. Patient does not present a risk to self or others at this time. Patient seen and evaluated. As per nursing report no acute events. Compliant with meds. On approach patient calm and cooperative, pleasant. No agitation or aggression noted. Has been in good behavioral control since admission. Appears to be responding well to the medication. Patient denies any suicidal/homicidal ideations. Patient denies any A/V hallucinations. Patient less isolative to the room. Comes out for meals, walks around unit. Sporadically. Attends art group. Discussed discharge, coping skills and compliance with medication. Discussed LAST. Patient declined and stated he would rather stay on th pills. Anticipated discharge tomorrow 8/8/24. Compliant with medication. Does not warrant continued Inpatient hospitalization. Patient does not present a risk to self or others at this time.

## 2024-08-07 NOTE — BH INPATIENT PSYCHIATRY PROGRESS NOTE - ORIENTATION
Still states he doesn't know why he was admitted

## 2024-08-07 NOTE — BH DISCHARGE NOTE NURSING/SOCIAL WORK/PSYCH REHAB - NSCDUDCCRISIS_PSY_A_CORE
[facility]  1 [phone]) [facility] ([phone])  Text "WELL" to 01140  Website: www.[employer]/.National Suicide Prevention Lifeline 8 (161) 156-1735/.  [employer]  1 (113) LIF[employer]ET (184-0948)/988 Suicide and Crisis Lifeline

## 2024-08-07 NOTE — BH INPATIENT PSYCHIATRY PROGRESS NOTE - NSBHASSESSSUMMFT_PSY_ALL_CORE
Patient is a 38 y.o. M domiciled at [facility], PMH of eczema, PPH of schizoaffective disorder, cannabis use, previous psychiatrist Dr. Tony ([phone]) and previous therapist Wendy Aguirre ([phone]) - patient no longer in treatment and case was closed 3 weeks ago due to non-adherence per [facility] director, no known SA or NSSIB, BIB EMS due to reported aggression.   Patient was interviewed over telepsych monitor. Patient was sitting on bed and appeared to be responding to internal stimuli. Patient was uncooperative with majority of interview. Patient reports that he is unsure why they "nominated" him to come into the ED. He initially reports that he was eating and watching tv in his room when they called EMS. He later discloses that he was being aggressive because he felt disrespected by "Remington" and "confronted him". Patient unwilling to expound. Patient denies auditory or visual hallucinations. Patient unwilling to answer any further questions.    Patient seen and evaluated. As per nursing report no acute events. Compliant with meds. On approach patient calm and cooperative, pleasant. No agitation or aggression noted. Has been in good behavioral control since admission. Appears to be responding well to the medication. Patient denies any suicidal/homicidal ideations. Patient denies any A/V hallucinations. Patient not laughing inappropriately anymore. Patient less isolative to the room. Comes out for meals, walks around unit. Sporadically. Attends art group. Anticipated discharge tomorrow 8/8/24. Compliant with medication. Does not warrant continued Inpatient hospitalization. Patient does not present a risk to self or others at this time.    #Admit    #Schizoaffective disorder  -Prolixin 5mg BID    -Hydroxyzine 50mg Q6 PRN for anxiety or insomnia  -Haldol 5mg Q6 PRN for agitation or psychosis  -Benadryl 50mg Q6 PRN for EPS  -Lorazepam 2mg Q6 PRN for aggression    #Agitation  -for agitation not amenable to verbal redirection, may give haldol 5 mg q6h prn, ativan 2 mg q6h prn, benadryl 50 mg q6h prn with escalation to IM if pt is a danger to self or/and others with repeat EKG to ensure QTc <500 ms.   Patient is a 38 y.o. M domiciled at [facility], PMH of eczema, PPH of schizoaffective disorder, cannabis use, previous psychiatrist Dr. Tony ([phone]) and previous therapist Wendy Aguirre ([phone]) - patient no longer in treatment and case was closed 3 weeks ago due to non-adherence per [facility] director, no known SA or NSSIB, BIB EMS due to reported aggression.   Patient was interviewed over telepsych monitor. Patient was sitting on bed and appeared to be responding to internal stimuli. Patient was uncooperative with majority of interview. Patient reports that he is unsure why they "nominated" him to come into the ED. He initially reports that he was eating and watching tv in his room when they called EMS. He later discloses that he was being aggressive because he felt disrespected by "Remington" and "confronted him". Patient unwilling to expound. Patient denies auditory or visual hallucinations. Patient unwilling to answer any further questions.    Patient seen and evaluated. As per nursing report no acute events. Compliant with meds. On approach patient calm and cooperative, pleasant. No agitation or aggression noted. Has been in good behavioral control since admission. Appears to be responding well to the medication. Patient denies any suicidal/homicidal ideations. Patient denies any A/V hallucinations. Patient not laughing inappropriately anymore. Patient less isolative to the room. Comes out for meals, walks around unit. Sporadically. Attends art group. Discussed discharge, coping skills and compliance with medication. Discussed LAST. Patient declined and stated he would rather stay on th pills. Anticipated discharge tomorrow 8/8/24. Compliant with medication. Does not warrant continued Inpatient hospitalization. Patient does not present a risk to self or others at this time.    #Admit    #Schizoaffective disorder  -Prolixin 5mg BID    -Hydroxyzine 50mg Q6 PRN for anxiety or insomnia  -Haldol 5mg Q6 PRN for agitation or psychosis  -Benadryl 50mg Q6 PRN for EPS  -Lorazepam 2mg Q6 PRN for aggression    #Agitation  -for agitation not amenable to verbal redirection, may give haldol 5 mg q6h prn, ativan 2 mg q6h prn, benadryl 50 mg q6h prn with escalation to IM if pt is a danger to self or/and others with repeat EKG to ensure QTc <500 ms.   Patient is a 38 y.o. M domiciled at [facility], PMH of eczema, PPH of schizoaffective disorder, cannabis use, previous psychiatrist Dr. Tony ([phone]) and previous therapist Wendy Aguirre ([phone]) - patient no longer in treatment and case was closed 3 weeks ago due to non-adherence per [facility] director, no known SA or NSSIB, BIB EMS due to reported aggression.   Patient was interviewed over telepsych monitor. Patient was sitting on bed and appeared to be responding to internal stimuli. Patient was uncooperative with majority of interview. Patient reports that he is unsure why they "nominated" him to come into the ED. He initially reports that he was eating and watching tv in his room when they called EMS. He later discloses that he was being aggressive because he felt disrespected by "Remington" and "confronted him". Patient unwilling to expound. Patient denies auditory or visual hallucinations. Patient unwilling to answer any further questions.    Patient seen and evaluated. As per nursing report no acute events. Compliant with meds. On approach patient calm and cooperative, pleasant. No agitation or aggression noted. Has been in good behavioral control since admission. Appears to be responding well to the medication. Patient denies any suicidal/homicidal ideations. Patient denies any A/V hallucinations. Patient less isolative to the room. Comes out for meals, walks around unit. Sporadically. Attends art group. Discussed discharge, coping skills and compliance with medication. Discussed LAST. Patient declined and stated he would rather stay on th pills. Anticipated discharge tomorrow 8/8/24. Compliant with medication. Does not warrant continued Inpatient hospitalization. Patient does not present a risk to self or others at this time.    #Admit    #Schizoaffective disorder  -Prolixin 5mg BID    -Hydroxyzine 50mg Q6 PRN for anxiety or insomnia  -Haldol 5mg Q6 PRN for agitation or psychosis  -Benadryl 50mg Q6 PRN for EPS  -Lorazepam 2mg Q6 PRN for aggression    #Agitation  -for agitation not amenable to verbal redirection, may give haldol 5 mg q6h prn, ativan 2 mg q6h prn, benadryl 50 mg q6h prn with escalation to IM if pt is a danger to self or/and others with repeat EKG to ensure QTc <500 ms.

## 2024-08-07 NOTE — BH DISCHARGE NOTE NURSING/SOCIAL WORK/PSYCH REHAB - PATIENT PORTAL LINK FT
You can access the FollowMyHealth Patient Portal offered by [facility] by registering at the following website: [email or web address]. By joining [employer]’s FollowMyHealth portal, you will also be able to view your health information using other applications (apps) compatible with our system.

## 2024-08-08 VITALS
DIASTOLIC BLOOD PRESSURE: 99 MMHG | TEMPERATURE: 98 F | HEART RATE: 84 BPM | RESPIRATION RATE: 18 BRPM | SYSTOLIC BLOOD PRESSURE: 134 MMHG

## 2024-08-08 PROCEDURE — 99238 HOSP IP/OBS DSCHRG MGMT 30/<: CPT

## 2024-08-08 RX ADMIN — Medication 5 MILLIGRAM(S): at 08:32

## 2024-08-08 NOTE — BH INPATIENT PSYCHIATRY PROGRESS NOTE - NSBHATTESTAPPBILLTIME_PSY_A_CORE
I attest my time as BAILEY is greater than 50% of the total combined time spent on qualifying patient care activities. I have reviewed and verified the documentation.
I attest my time as BALIEY is greater than 50% of the total combined time spent on qualifying patient care activities. I have reviewed and verified the documentation.
I attest my time as BAILEY is greater than 50% of the total combined time spent on qualifying patient care activities. I have reviewed and verified the documentation.
I attest my time as BAILEY is greater than 50% of the total combined time spent on qualifying patient care activities. I have reviewed and verified the documentation.

## 2024-08-08 NOTE — BH INPATIENT PSYCHIATRY PROGRESS NOTE - NSBHMSEBEHAV_PSY_A_CORE
Patient responding to internal stimuli/Cooperative
Cooperative
Patient responding to internal stimuli/Cooperative
Cooperative
Patient responding to internal stimuli/Cooperative
Cooperative
Cooperative
Patient responding to internal stimuli/Cooperative

## 2024-08-08 NOTE — BH INPATIENT PSYCHIATRY PROGRESS NOTE - NSBHCHARTREVIEWVS_PSY_A_CORE FT
Vital Signs Last 24 Hrs  T(C): 36.7 (08-05-24 @ 09:23), Max: 36.7 (08-05-24 @ 09:23)  T(F): 98.1 (08-05-24 @ 09:23), Max: 98.1 (08-05-24 @ 09:23)  HR: 120 (08-05-24 @ 09:23) (106 - 120)  BP: 118/83 (08-05-24 @ 09:23) (118/83 - 122/94)  BP(mean): --  RR: 18 (08-05-24 @ 09:23) (18 - 18)  SpO2: --    
Vital Signs Last 24 Hrs  T(C): 36.5 (08-08-24 @ 08:24), Max: 36.5 (08-08-24 @ 08:24)  T(F): 97.7 (08-08-24 @ 08:24), Max: 97.7 (08-08-24 @ 08:24)  HR: 84 (08-08-24 @ 08:24) (84 - 84)  BP: 134/99 (08-08-24 @ 08:24) (134/99 - 134/99)  BP(mean): --  RR: 18 (08-08-24 @ 08:24) (18 - 18)  SpO2: --    
Vital Signs Last 24 Hrs  T(C): 35.8 (07-31-24 @ 16:32), Max: 35.8 (07-31-24 @ 16:32)  T(F): 96.5 (07-31-24 @ 16:32), Max: 96.5 (07-31-24 @ 16:32)  HR: 84 (07-31-24 @ 16:32) (84 - 84)  BP: 140/83 (07-31-24 @ 16:32) (140/83 - 140/83)  BP(mean): --  RR: 16 (07-31-24 @ 16:32) (16 - 16)  SpO2: --    
Vital Signs Last 24 Hrs  T(C): 36.6 (08-03-24 @ 08:17), Max: 36.6 (08-03-24 @ 08:17)  T(F): 97.8 (08-03-24 @ 08:17), Max: 97.8 (08-03-24 @ 08:17)  HR: 98 (08-03-24 @ 08:17) (98 - 103)  BP: 130/98 (08-03-24 @ 08:17) (126/88 - 130/98)  BP(mean): --  RR: 18 (08-03-24 @ 08:17) (16 - 18)  SpO2: --    
Vital Signs Last 24 Hrs  T(C): 36.5 (08-07-24 @ 08:22), Max: 36.5 (08-07-24 @ 08:22)  T(F): 97.7 (08-07-24 @ 08:22), Max: 97.7 (08-07-24 @ 08:22)  HR: 89 (08-07-24 @ 08:22) (74 - 89)  BP: 129/95 (08-07-24 @ 08:22) (122/85 - 129/95)  BP(mean): --  RR: 18 (08-07-24 @ 08:22) (18 - 18)  SpO2: --    
Vital Signs Last 24 Hrs  T(C): 36.6 (08-02-24 @ 08:32), Max: 36.6 (08-02-24 @ 08:32)  T(F): 97.9 (08-02-24 @ 08:32), Max: 97.9 (08-02-24 @ 08:32)  HR: 98 (08-02-24 @ 08:32) (70 - 98)  BP: 139/95 (08-02-24 @ 08:32) (129/92 - 139/95)  BP(mean): --  RR: 18 (08-02-24 @ 08:32) (16 - 18)  SpO2: --    
Vital Signs Last 24 Hrs  T(C): 36.3 (08-04-24 @ 08:16), Max: 36.4 (08-03-24 @ 16:06)  T(F): 97.4 (08-04-24 @ 08:16), Max: 97.6 (08-03-24 @ 16:06)  HR: 93 (08-04-24 @ 08:16) (88 - 93)  BP: 135/104 (08-04-24 @ 08:16) (126/94 - 135/104)  BP(mean): --  RR: 20 (08-04-24 @ 08:16) (16 - 20)  SpO2: --    
Vital Signs Last 24 Hrs  T(C): 36.5 (08-06-24 @ 08:14), Max: 36.5 (08-06-24 @ 08:14)  T(F): 97.7 (08-06-24 @ 08:14), Max: 97.7 (08-06-24 @ 08:14)  HR: 98 (08-06-24 @ 08:40) (98 - 118)  BP: 126/95 (08-06-24 @ 08:14) (126/95 - 126/95)  BP(mean): --  RR: 18 (08-06-24 @ 08:14) (18 - 18)  SpO2: --

## 2024-08-08 NOTE — BH INPATIENT PSYCHIATRY PROGRESS NOTE - NSBHMSEREMMEM_PSY_A_CORE
Unable to assess
Unable to assess
Impaired
Unable to assess
Impaired
Unable to assess

## 2024-08-08 NOTE — BH INPATIENT PSYCHIATRY PROGRESS NOTE - NSICDXBHPRIMARYDX_PSY_ALL_CORE
Schizoaffective disorder, unspecified type   F25.9  

## 2024-08-08 NOTE — BH INPATIENT PSYCHIATRY PROGRESS NOTE - NSDCCRITERIA_PSY_ALL_CORE
To be discharged once deemed not a danger to self or others.

## 2024-08-08 NOTE — BH SCALES AND SCREENS - NSAIMSSEVERABNMOV_PSY_ALL_CORE
Preconception Care    If you are thinking about becoming pregnant in the near future or actively trying to conceive we want to make sure you are as healthy as possible before becoming pregnant. By meeting with your midwife or provider prior to getting pregnant it allows us to address any health needs that can affect pregnancy. Please discuss your personal and family history with your midwife in order for us to identify any risk factors    If you wish to attempt pregnancy stop whichever form of contraception you use. If you have an IUD it can be removed in the office and you can start trying right away. If you take OCPs finish current pack, cycle, and then you can actively start trying    Make sure all the medications you are taking are safe for pregnancy. This is especially important for women with chronic health conditions such as diabetes, seizure disorders, and/or hypertension. If you are unsure if your medications are safe we can discuss them with you, do not abruptly stop taking something if you've been prescribed a medication by a medical provider    Folic acid 400-800 mcg per day by mouth daily, begin this routine 1 to 12 months prior to planned conception, and continue through at least 13 weeks gestation. Some prenatal/multi-vitamins contain enough folic acid     Be up to date on all your vaccines. Avoid pregnancy for 1 month after administration of varicella/ Rubella (MMR) vaccines    We encourage all women to be at healthy BMI (<26) when attempting pregnancy, it is healthier for both you and your baby. If you are overweight you can make a healthy choice by eating better and exercising more. Waiting to get pregnant at a healthy weight is an excellent choice.                                     Eat a healthy, well-balanced diet containing lots of fresh fruit and vegetables (frozen without added sugar is okay), protein, and healthy carbohydrates such as whole wheat breads and pastas    Exercise regularly. If  you are wishing to get pregnant maintain normal exercise routine. If you don't exercise this is a great time for light activity daily such as walking/swimming    Limit caffeine intake to less than 200 mg per day, typically 1-2 cups of regular coffee is about 200 mg.     Avoid cigarettes, alcohol, and recreational drug use while attempting pregnancy. If you are actively trying to conceive but you get your period or a negative pregnancy test it is okay to have alcohol in moderation until you are actively trying again    If you have the potential to toxic chemical exposures in your work place or home please use special precautions    Menstrual Cycles    Knowing your cycle is incredibly important when attempting pregnancy    Your cycle begins day 1 of your period and goes until the 1st day of your next period. Typically women have 28-32 day cycles. If your cycles are shorter or longer it can be a normal variation.     Women typically ovulate in the middle of their cycle    There are many great apps that can help you track you cycle monthly to establish when you are ovulating    You may also start taking your temperature daily with a basal body temp thermometer to help identify when you ovulate    There are also ovulation predictor kits available over the counter at the pharmacy    If you want more information please contact your midwife      It can take up to 1 year for a woman under the age of 35 or 6 months for a woman over the age of 35 to conceive. If it takes longer than this we would like to evaluate you for fertility issues.       Preventive Health Recommendations  Female Ages 21 - 39  Yearly exam:   See your health care provider every year in order to  1. Review health changes.  2. Discuss preventive care.    3. Review your medicines if you your provider has prescribed any.    You do not need a Pap test if your uterus was removed (hysterectomy) and you have not had cancer.  You should be tested each year for  STIs (sexually transmitted diseases) if you're at risk.   Talk to your provider about how often to have your cholesterol checked, about every 5 years if normal.  If you are at risk for diabetes, you should have a diabetes test (fasting glucose).  Vitamin D deficiency screening and thyroid disease screening is also recommended every 3-5 years depending on risk factors or more often if symptomatic  PAP Smear:   Until age 30: Get a Pap test every three years (more often if you have had an abnormal result)    After age 30: Talk to your provider about whether you should have a Pap test every 3 years or have a Pap test with HPV screening every 5 years.   Shots: Get a flu shot each year. Get a tetanus shot every 10 years.   Nutrition:   Eat at least 5 servings of fruits and vegetables each day  Eat REAL food, stay away from processed foods.  Eat whole-grain bread, whole-wheat pasta and brown rice instead of white grains and rice.  For bone health:  Eat calcium-rich foods or take calcium pills (500 to 600 mg) twice a day with food (1200 mg per day). Also take vitamin D (2000 IUs) each day.     Lifestyle  Exercise regularly (30 minutes a day, 5 days of the week). This will help you control your weight and prevent disease.  Weight bearing exercise such as weight lifting, walking, running and yoga will be beneficial later in life preventing osteoporosis   Limit alcohol to one drink per day.  No smoking.   Wear sunscreen to prevent skin cancer.  See your dentist every six months to one year for an exam and cleaning depending on their recommendation  Get an eye exam every two years or more often if you wear glasses or contacts.      None

## 2024-08-08 NOTE — BH INPATIENT PSYCHIATRY PROGRESS NOTE - NSBHASSESSSUMMFT_PSY_ALL_CORE
Patient is a 38 y.o. M domiciled at [facility], PMH of eczema, PPH of schizoaffective disorder, cannabis use, previous psychiatrist Dr. Tony ([phone]) and previous therapist Wendy Aguirre ([phone]) - patient no longer in treatment and case was closed 3 weeks ago due to non-adherence per [facility] director, no known SA or NSSIB, BIB EMS due to reported aggression.   Patient was interviewed over telepsych monitor. Patient was sitting on bed and appeared to be responding to internal stimuli. Patient was uncooperative with majority of interview. Patient reports that he is unsure why they "nominated" him to come into the ED. He initially reports that he was eating and watching tv in his room when they called EMS. He later discloses that he was being aggressive because he felt disrespected by "Remington" and "confronted him". Patient unwilling to expound. Patient denies auditory or visual hallucinations. Patient unwilling to answer any further questions.    D/C today. Transportation provided. Meds sent to [facility] pharmacy as requested by patient. Patient seen and evaluated by writer and Attending psychiatrist Dr. Nur. Patient appeared to be in good spirits today. Endorses a better mood. Insight and judgment have improved. Has been in good behavioral control for the duration of the hospital stay. Denies suicidal/ homicidal ideations. Patient able to contract for safety. Much noted improvement in internal preoccupation since admission.  Compliant with medication and has been for the duration of the hospital stay. Does not warrant continued Inpatient hospitalization. Writer and RN reviewed D/C papers with patient. Patient verbalized understanding. Patient does not appear to be of risk to self or others at this time.    #Admit    #Schizoaffective disorder  -Prolixin 5mg BID    -Hydroxyzine 50mg Q6 PRN for anxiety or insomnia  -Haldol 5mg Q6 PRN for agitation or psychosis  -Benadryl 50mg Q6 PRN for EPS  -Lorazepam 2mg Q6 PRN for aggression    #Agitation  -for agitation not amenable to verbal redirection, may give haldol 5 mg q6h prn, ativan 2 mg q6h prn, benadryl 50 mg q6h prn with escalation to IM if pt is a danger to self or/and others with repeat EKG to ensure QTc <500 ms.

## 2024-08-08 NOTE — BH INPATIENT PSYCHIATRY PROGRESS NOTE - NSTXVIOLNTGOALOTHER_PSY_ALL_CORE
Will help identify at least 2 coping skills to manage or decrease aggression

## 2024-08-08 NOTE — BH INPATIENT PSYCHIATRY PROGRESS NOTE - NSBHCONSBHPROVCNTCTNOFT_PSY_A_CORE
psychiatrist Dr. Tony ([phone]) and previous therapist Wendy Aguirre ([phone]) - patient no longer in treatment and case was closed 3 weeks ago due to non-adherence
psychiatrist Dr. Tony ([phone]) and previous therapist Wendy Aguirre ([phone]) - patient no longer in treatment and case was closed 3 weeks ago due to non-adherence
psychiatrist Dr. Tony ([phone]) and previous therapist Wendy Aguirre ([phone]) - patient no longer in treatment and case was closed 3 weeks ago due to non-adherence
psychiatrist Dr. Tony ([phone]) and previous therapist Wendy Aguirre ([phone]) - patient no longer in treatment and case was closed 3 weeks ago due to non-adherence
psychiatrist Dr. Tony ([phone]) and previous therapist Wendy Aguirre ([phone]) - patient no longer in treatment and case was closed 3 weeks ago due to non-adherence
psychiatrist Dr. Tony ([phone]) and previous therapist Wendy Aguirre ([phone]) - patient no longer in treatment and case was closed 3 weeks ago due to non-adherence
psychiatrist Dr. Tony ([phone]) and previous therapist Wendy Aguirre ([phone]) - patient no longer in treatment and case was closed 3 weeks ago due to non-adherence
psychiatrist Dr. Toyn ([phone]) and previous therapist Wendy Aguirre ([phone]) - patient no longer in treatment and case was closed 3 weeks ago due to non-adherence

## 2024-08-08 NOTE — BH INPATIENT PSYCHIATRY PROGRESS NOTE - PRN MEDS
MEDICATIONS  (PRN):  acetaminophen     Tablet .. 650 milliGRAM(s) Oral every 6 hours PRN Temp greater or equal to 38C (100.4F), Moderate Pain (4 - 6), Severe Pain (7 - 10)  aluminum hydroxide/magnesium hydroxide/simethicone Suspension 30 milliLiter(s) Oral every 6 hours PRN Dyspepsia  diphenhydrAMINE 50 milliGRAM(s) Oral every 6 hours PRN EPS  haloperidol     Tablet 5 milliGRAM(s) Oral every 6 hours PRN agitation  hydrOXYzine hydrochloride 50 milliGRAM(s) Oral every 6 hours PRN Anxiety  LORazepam     Tablet 2 milliGRAM(s) Oral every 6 hours PRN severe agitation  

## 2024-08-08 NOTE — BH INPATIENT PSYCHIATRY PROGRESS NOTE - NSBHMETABOLIC_PSY_ALL_CORE_FT
BMI: BMI (kg/m2): 16.2 (07-30-24 @ 19:00)  HbA1c: A1C with Estimated Average Glucose Result: 6.0 % (08-01-24 @ 08:23)    Glucose:   BP: 135/104 (08-04-24 @ 08:16) (126/88 - 139/95)Vital Signs Last 24 Hrs  T(C): 36.3 (08-04-24 @ 08:16), Max: 36.4 (08-03-24 @ 16:06)  T(F): 97.4 (08-04-24 @ 08:16), Max: 97.6 (08-03-24 @ 16:06)  HR: 93 (08-04-24 @ 08:16) (88 - 93)  BP: 135/104 (08-04-24 @ 08:16) (126/94 - 135/104)  BP(mean): --  RR: 20 (08-04-24 @ 08:16) (16 - 20)  SpO2: --      Lipid Panel: Date/Time: 08-01-24 @ 08:23  Cholesterol, Serum: 146  LDL Cholesterol Calculated: 79  HDL Cholesterol, Serum: 58  Total Cholesterol/HDL Ration Measurement: --  Triglycerides, Serum: 45  
BMI: BMI (kg/m2): 16.2 (07-30-24 @ 19:00)  HbA1c: A1C with Estimated Average Glucose Result: 6.0 % (08-01-24 @ 08:23)    Glucose:   BP: 134/99 (08-08-24 @ 08:24) (122/85 - 134/99)Vital Signs Last 24 Hrs  T(C): 36.5 (08-08-24 @ 08:24), Max: 36.5 (08-08-24 @ 08:24)  T(F): 97.7 (08-08-24 @ 08:24), Max: 97.7 (08-08-24 @ 08:24)  HR: 84 (08-08-24 @ 08:24) (84 - 84)  BP: 134/99 (08-08-24 @ 08:24) (134/99 - 134/99)  BP(mean): --  RR: 18 (08-08-24 @ 08:24) (18 - 18)  SpO2: --      Lipid Panel: Date/Time: 08-01-24 @ 08:23  Cholesterol, Serum: 146  LDL Cholesterol Calculated: 79  HDL Cholesterol, Serum: 58  Total Cholesterol/HDL Ration Measurement: --  Triglycerides, Serum: 45  
BMI: BMI (kg/m2): 16.2 (07-30-24 @ 19:00)  HbA1c: A1C with Estimated Average Glucose Result: 6.0 % (08-01-24 @ 08:23)    Glucose:   BP: 139/95 (08-02-24 @ 08:32) (121/81 - 140/83)Vital Signs Last 24 Hrs  T(C): 36.6 (08-02-24 @ 08:32), Max: 36.6 (08-02-24 @ 08:32)  T(F): 97.9 (08-02-24 @ 08:32), Max: 97.9 (08-02-24 @ 08:32)  HR: 98 (08-02-24 @ 08:32) (70 - 98)  BP: 139/95 (08-02-24 @ 08:32) (129/92 - 139/95)  BP(mean): --  RR: 18 (08-02-24 @ 08:32) (16 - 18)  SpO2: --      Lipid Panel: Date/Time: 08-01-24 @ 08:23  Cholesterol, Serum: 146  LDL Cholesterol Calculated: 79  HDL Cholesterol, Serum: 58  Total Cholesterol/HDL Ration Measurement: --  Triglycerides, Serum: 45  
BMI: BMI (kg/m2): 16.2 (07-30-24 @ 19:00)  HbA1c: A1C with Estimated Average Glucose Result: 6.0 % (08-01-24 @ 08:23)    Glucose:   BP: 129/95 (08-07-24 @ 08:22) (118/83 - 129/95)Vital Signs Last 24 Hrs  T(C): 36.5 (08-07-24 @ 08:22), Max: 36.5 (08-07-24 @ 08:22)  T(F): 97.7 (08-07-24 @ 08:22), Max: 97.7 (08-07-24 @ 08:22)  HR: 89 (08-07-24 @ 08:22) (74 - 89)  BP: 129/95 (08-07-24 @ 08:22) (122/85 - 129/95)  BP(mean): --  RR: 18 (08-07-24 @ 08:22) (18 - 18)  SpO2: --      Lipid Panel: Date/Time: 08-01-24 @ 08:23  Cholesterol, Serum: 146  LDL Cholesterol Calculated: 79  HDL Cholesterol, Serum: 58  Total Cholesterol/HDL Ration Measurement: --  Triglycerides, Serum: 45  
BMI: BMI (kg/m2): 16.2 (07-30-24 @ 19:00)  HbA1c: A1C with Estimated Average Glucose Result: 6.0 % (08-01-24 @ 08:23)    Glucose:   BP: 130/98 (08-03-24 @ 08:17) (126/88 - 140/83)Vital Signs Last 24 Hrs  T(C): 36.6 (08-03-24 @ 08:17), Max: 36.6 (08-03-24 @ 08:17)  T(F): 97.8 (08-03-24 @ 08:17), Max: 97.8 (08-03-24 @ 08:17)  HR: 98 (08-03-24 @ 08:17) (98 - 103)  BP: 130/98 (08-03-24 @ 08:17) (126/88 - 130/98)  BP(mean): --  RR: 18 (08-03-24 @ 08:17) (16 - 18)  SpO2: --      Lipid Panel: Date/Time: 08-01-24 @ 08:23  Cholesterol, Serum: 146  LDL Cholesterol Calculated: 79  HDL Cholesterol, Serum: 58  Total Cholesterol/HDL Ration Measurement: --  Triglycerides, Serum: 45  
BMI: BMI (kg/m2): 16.2 (07-30-24 @ 19:00)  HbA1c:   Glucose:   BP: 140/83 (07-31-24 @ 16:32) (120/66 - 140/83)Vital Signs Last 24 Hrs  T(C): 35.8 (07-31-24 @ 16:32), Max: 35.8 (07-31-24 @ 16:32)  T(F): 96.5 (07-31-24 @ 16:32), Max: 96.5 (07-31-24 @ 16:32)  HR: 84 (07-31-24 @ 16:32) (84 - 84)  BP: 140/83 (07-31-24 @ 16:32) (140/83 - 140/83)  BP(mean): --  RR: 16 (07-31-24 @ 16:32) (16 - 16)  SpO2: --      Lipid Panel: Date/Time: 08-01-24 @ 08:23  Cholesterol, Serum: 146  LDL Cholesterol Calculated: 79  HDL Cholesterol, Serum: 58  Total Cholesterol/HDL Ration Measurement: --  Triglycerides, Serum: 45  
BMI: BMI (kg/m2): 16.2 (07-30-24 @ 19:00)  HbA1c: A1C with Estimated Average Glucose Result: 6.0 % (08-01-24 @ 08:23)    Glucose:   BP: 118/83 (08-05-24 @ 09:23) (118/83 - 135/104)Vital Signs Last 24 Hrs  T(C): 36.7 (08-05-24 @ 09:23), Max: 36.7 (08-05-24 @ 09:23)  T(F): 98.1 (08-05-24 @ 09:23), Max: 98.1 (08-05-24 @ 09:23)  HR: 120 (08-05-24 @ 09:23) (106 - 120)  BP: 118/83 (08-05-24 @ 09:23) (118/83 - 122/94)  BP(mean): --  RR: 18 (08-05-24 @ 09:23) (18 - 18)  SpO2: --      Lipid Panel: Date/Time: 08-01-24 @ 08:23  Cholesterol, Serum: 146  LDL Cholesterol Calculated: 79  HDL Cholesterol, Serum: 58  Total Cholesterol/HDL Ration Measurement: --  Triglycerides, Serum: 45  
BMI: BMI (kg/m2): 16.2 (07-30-24 @ 19:00)  HbA1c: A1C with Estimated Average Glucose Result: 6.0 % (08-01-24 @ 08:23)    Glucose:   BP: 126/95 (08-06-24 @ 08:14) (118/83 - 135/104)Vital Signs Last 24 Hrs  T(C): 36.5 (08-06-24 @ 08:14), Max: 36.5 (08-06-24 @ 08:14)  T(F): 97.7 (08-06-24 @ 08:14), Max: 97.7 (08-06-24 @ 08:14)  HR: 98 (08-06-24 @ 08:40) (98 - 118)  BP: 126/95 (08-06-24 @ 08:14) (126/95 - 126/95)  BP(mean): --  RR: 18 (08-06-24 @ 08:14) (18 - 18)  SpO2: --      Lipid Panel: Date/Time: 08-01-24 @ 08:23  Cholesterol, Serum: 146  LDL Cholesterol Calculated: 79  HDL Cholesterol, Serum: 58  Total Cholesterol/HDL Ration Measurement: --  Triglycerides, Serum: 45

## 2024-08-08 NOTE — BH INPATIENT PSYCHIATRY PROGRESS NOTE - NSTXSUBMISGOAL_PSY_ALL_CORE
Will develop a relapse prevention plan

## 2024-08-08 NOTE — BH INPATIENT PSYCHIATRY PROGRESS NOTE - NSBHMSEPERCEPT_PSY_A_CORE
No abnormalities/Other
No abnormalities
Patient does not appear to be forthcoming about perceptions as he is clearly responding to internal stimuli but denies hallucinations/Other
No abnormalities
Patient does not appear to be forthcoming about perceptions as he is clearly responding to internal stimuli but denies hallucinations/Other

## 2024-08-08 NOTE — BH INPATIENT PSYCHIATRY PROGRESS NOTE - NSICDXBHSECONDARYDX_PSY_ALL_CORE
Substance use   F19.90  

## 2024-08-08 NOTE — BH INPATIENT PSYCHIATRY PROGRESS NOTE - NSBHATTESTBILLING_PSY_A_CORE
64658-Ofmsxkrsal OBS or IP - moderate complexity OR 35-49 mins
42495-Hblebxycgy OBS or IP - moderate complexity OR 35-49 mins
25185-Iakjtgsznt OBS or IP - moderate complexity OR 35-49 mins
77333-Sprhxlcxbd OBS or IP - moderate complexity OR 35-49 mins
57386-Uqdsmsowti OBS or IP - high complexity OR 50-79 mins
26513-Xglhakqqlv OBS or IP - moderate complexity OR 35-49 mins
27785-Bpaxjywohy OBS or IP - moderate complexity OR 35-49 mins
13973-Kndzuupumy OBS or IP - moderate complexity OR 35-49 mins

## 2024-08-08 NOTE — BH INPATIENT PSYCHIATRY PROGRESS NOTE - NSBHCONSULTIPREASON_PSY_A_CORE
danger to others; mental illness expected to respond to inpatient care

## 2024-08-08 NOTE — BH INPATIENT PSYCHIATRY PROGRESS NOTE - NSBHFUPINTERVALHXFT_PSY_A_CORE
D/C today. Transportation provided. Meds sent to [facility] pharmacy as requested by patient. Patient seen and evaluated by writer and Attending psychiatrist Dr. Nur. Patient appeared to be in good spirits today. Endorses a better mood. Insight and judgment have improved. Has been in good behavioral control for the duration of the hospital stay. Denies suicidal/ homicidal ideations. Patient able to contract for safety. Much noted improvement in internal preoccupation since admission.  Compliant with medication and has been for the duration of the hospital stay. Does not warrant continued Inpatient hospitalization. Writer and RN reviewed D/C papers with patient. Patient verbalized understanding. Patient does not appear to be of risk to self or others at this time.

## 2024-08-09 NOTE — BH SOCIAL WORK CONFIRMATION FOLLOW UP NOTE - NSCOMMENTS_PSY_ALL_CORE
Caring contact call: Exclusion.  Cristian broke his outpatient mental health appointment at [facility] 8/12, [phone].  spoke with Carmela at patient's residence [facility] who reports he is doing well and appointment will be rescheduled.     Caring contact call: Joshua.

## 2024-08-16 DIAGNOSIS — Z11.52 ENCOUNTER FOR SCREENING FOR COVID-19: ICD-10-CM

## 2024-08-16 DIAGNOSIS — R73.03 PREDIABETES: ICD-10-CM

## 2024-08-16 DIAGNOSIS — F25.9 SCHIZOAFFECTIVE DISORDER, UNSPECIFIED: ICD-10-CM

## 2024-08-16 DIAGNOSIS — F12.90 CANNABIS USE, UNSPECIFIED, UNCOMPLICATED: ICD-10-CM

## 2024-09-19 ENCOUNTER — APPOINTMENT (OUTPATIENT)
Dept: INTERNAL MEDICINE | Facility: CLINIC | Age: 39
End: 2024-09-19

## 2025-02-01 ENCOUNTER — EMERGENCY (EMERGENCY)
Facility: HOSPITAL | Age: 40
LOS: 0 days | Discharge: HOME | End: 2025-02-01
Attending: EMERGENCY MEDICINE
Payer: MEDICARE

## 2025-02-01 VITALS
HEART RATE: 75 BPM | DIASTOLIC BLOOD PRESSURE: 101 MMHG | WEIGHT: 199.08 LBS | OXYGEN SATURATION: 100 % | RESPIRATION RATE: 16 BRPM | TEMPERATURE: 98 F | SYSTOLIC BLOOD PRESSURE: 137 MMHG

## 2025-02-01 DIAGNOSIS — Z01.89 ENCOUNTER FOR OTHER SPECIFIED SPECIAL EXAMINATIONS: ICD-10-CM

## 2025-02-01 DIAGNOSIS — Z04.3 ENCOUNTER FOR EXAMINATION AND OBSERVATION FOLLOWING OTHER ACCIDENT: ICD-10-CM

## 2025-02-01 DIAGNOSIS — F20.9 SCHIZOPHRENIA, UNSPECIFIED: ICD-10-CM

## 2025-02-01 DIAGNOSIS — Z91.018 ALLERGY TO OTHER FOODS: ICD-10-CM

## 2025-02-01 PROCEDURE — 99282 EMERGENCY DEPT VISIT SF MDM: CPT

## 2025-02-01 PROCEDURE — 99283 EMERGENCY DEPT VISIT LOW MDM: CPT | Mod: FS

## 2025-08-04 ENCOUNTER — APPOINTMENT (OUTPATIENT)
Dept: INTERNAL MEDICINE | Facility: CLINIC | Age: 40
End: 2025-08-04
Payer: MEDICARE

## 2025-08-04 ENCOUNTER — OUTPATIENT (OUTPATIENT)
Dept: OUTPATIENT SERVICES | Facility: HOSPITAL | Age: 40
LOS: 1 days | End: 2025-08-04
Payer: MEDICARE

## 2025-08-04 VITALS
BODY MASS INDEX: 19.18 KG/M2 | TEMPERATURE: 97 F | HEART RATE: 68 BPM | HEIGHT: 71 IN | DIASTOLIC BLOOD PRESSURE: 65 MMHG | WEIGHT: 137 LBS | OXYGEN SATURATION: 100 % | SYSTOLIC BLOOD PRESSURE: 106 MMHG

## 2025-08-04 DIAGNOSIS — Z00.00 ENCOUNTER FOR GENERAL ADULT MEDICAL EXAMINATION W/OUT ABNORMAL FINDINGS: ICD-10-CM

## 2025-08-04 DIAGNOSIS — Z00.00 ENCOUNTER FOR GENERAL ADULT MEDICAL EXAMINATION WITHOUT ABNORMAL FINDINGS: ICD-10-CM

## 2025-08-04 DIAGNOSIS — F25.9 SCHIZOAFFECTIVE DISORDER, UNSPECIFIED: ICD-10-CM

## 2025-08-04 PROCEDURE — 99213 OFFICE O/P EST LOW 20 MIN: CPT

## 2025-08-04 RX ORDER — FLUPHENAZINE HYDROCHLORIDE 5 MG/1
5 TABLET, FILM COATED ORAL DAILY
Qty: 30 | Refills: 0 | Status: ACTIVE | COMMUNITY
Start: 2025-08-04

## 2025-08-12 DIAGNOSIS — F25.9 SCHIZOAFFECTIVE DISORDER, UNSPECIFIED: ICD-10-CM

## 2025-08-12 DIAGNOSIS — Z00.00 ENCOUNTER FOR GENERAL ADULT MEDICAL EXAMINATION WITHOUT ABNORMAL FINDINGS: ICD-10-CM
